# Patient Record
Sex: MALE | Race: ASIAN | NOT HISPANIC OR LATINO
[De-identification: names, ages, dates, MRNs, and addresses within clinical notes are randomized per-mention and may not be internally consistent; named-entity substitution may affect disease eponyms.]

---

## 2018-08-23 PROBLEM — Z00.00 ENCOUNTER FOR PREVENTIVE HEALTH EXAMINATION: Status: ACTIVE | Noted: 2018-08-23

## 2018-09-05 ENCOUNTER — RESULT REVIEW (OUTPATIENT)
Age: 53
End: 2018-09-05

## 2018-09-05 ENCOUNTER — APPOINTMENT (OUTPATIENT)
Dept: GASTROENTEROLOGY | Facility: HOSPITAL | Age: 53
End: 2018-09-05

## 2018-09-05 ENCOUNTER — OUTPATIENT (OUTPATIENT)
Dept: OUTPATIENT SERVICES | Facility: HOSPITAL | Age: 53
LOS: 1 days | Discharge: HOME | End: 2018-09-05
Payer: MEDICAID

## 2018-09-05 DIAGNOSIS — K83.8 OTHER SPECIFIED DISEASES OF BILIARY TRACT: ICD-10-CM

## 2018-09-05 LAB
APTT BLD: 29.4 SEC — SIGNIFICANT CHANGE UP (ref 27.5–37.4)
BASOPHILS # BLD AUTO: 0.02 K/UL — SIGNIFICANT CHANGE UP (ref 0–0.2)
BASOPHILS NFR BLD AUTO: 0.5 % — SIGNIFICANT CHANGE UP (ref 0–2)
EOSINOPHIL # BLD AUTO: 0.17 K/UL — SIGNIFICANT CHANGE UP (ref 0–0.5)
EOSINOPHIL NFR BLD AUTO: 4 % — SIGNIFICANT CHANGE UP (ref 0–6)
HCT VFR BLD CALC: 44.5 % — SIGNIFICANT CHANGE UP (ref 39–50)
HGB BLD-MCNC: 15.3 G/DL — SIGNIFICANT CHANGE UP (ref 13–17)
IMM GRANULOCYTES # BLD AUTO: 0.01 # — SIGNIFICANT CHANGE UP
IMM GRANULOCYTES NFR BLD AUTO: 0.2 % — SIGNIFICANT CHANGE UP (ref 0–1.5)
INR BLD: 1.11 — SIGNIFICANT CHANGE UP (ref 0.88–1.17)
LYMPHOCYTES # BLD AUTO: 0.81 K/UL — LOW (ref 1–3.3)
LYMPHOCYTES # BLD AUTO: 19.2 % — SIGNIFICANT CHANGE UP (ref 13–44)
MANUAL SMEAR VERIFICATION: SIGNIFICANT CHANGE UP
MCHC RBC-ENTMCNC: 31.9 PG — SIGNIFICANT CHANGE UP (ref 27–34)
MCHC RBC-ENTMCNC: 34.4 % — SIGNIFICANT CHANGE UP (ref 32–36)
MCV RBC AUTO: 92.7 FL — SIGNIFICANT CHANGE UP (ref 80–100)
MONOCYTES # BLD AUTO: 0.6 K/UL — SIGNIFICANT CHANGE UP (ref 0–0.9)
MONOCYTES NFR BLD AUTO: 14.2 % — HIGH (ref 2–14)
MORPHOLOGY BLD-IMP: SIGNIFICANT CHANGE UP
NEUTROPHILS # BLD AUTO: 2.61 K/UL — SIGNIFICANT CHANGE UP (ref 1.8–7.4)
NEUTROPHILS NFR BLD AUTO: 61.9 % — SIGNIFICANT CHANGE UP (ref 43–77)
NRBC # FLD: 0 — SIGNIFICANT CHANGE UP
PLATELET # BLD AUTO: 75 K/UL — LOW (ref 150–400)
PLATELET COUNT - ESTIMATE: SIGNIFICANT CHANGE UP
PMV BLD: 11.3 FL — SIGNIFICANT CHANGE UP (ref 7–13)
PROTHROM AB SERPL-ACNC: 12.4 SEC — SIGNIFICANT CHANGE UP (ref 9.8–13.1)
RBC # BLD: 4.8 M/UL — SIGNIFICANT CHANGE UP (ref 4.2–5.8)
RBC # FLD: 13.1 % — SIGNIFICANT CHANGE UP (ref 10.3–14.5)
REVIEW TO FOLLOW: YES — SIGNIFICANT CHANGE UP
WBC # BLD: 4.22 K/UL — SIGNIFICANT CHANGE UP (ref 3.8–10.5)
WBC # FLD AUTO: 4.22 K/UL — SIGNIFICANT CHANGE UP (ref 3.8–10.5)

## 2018-09-05 PROCEDURE — 43274 ERCP DUCT STENT PLACEMENT: CPT | Mod: GC

## 2018-09-05 PROCEDURE — 74328 X-RAY BILE DUCT ENDOSCOPY: CPT | Mod: 26,GC

## 2018-09-05 PROCEDURE — 88305 TISSUE EXAM BY PATHOLOGIST: CPT | Mod: 26

## 2018-09-05 PROCEDURE — 43261 ENDO CHOLANGIOPANCREATOGRAPH: CPT | Mod: GC,59

## 2018-09-05 PROCEDURE — 43277 ERCP EA DUCT/AMPULLA DILATE: CPT | Mod: GC,59

## 2018-09-13 ENCOUNTER — CHART COPY (OUTPATIENT)
Age: 53
End: 2018-09-13

## 2018-10-29 ENCOUNTER — RESULT REVIEW (OUTPATIENT)
Age: 53
End: 2018-10-29

## 2018-10-29 ENCOUNTER — OUTPATIENT (OUTPATIENT)
Dept: OUTPATIENT SERVICES | Facility: HOSPITAL | Age: 53
LOS: 1 days | Discharge: HOME | End: 2018-10-29
Payer: MEDICAID

## 2018-10-29 ENCOUNTER — APPOINTMENT (OUTPATIENT)
Dept: GASTROENTEROLOGY | Facility: HOSPITAL | Age: 53
End: 2018-10-29

## 2018-10-29 DIAGNOSIS — K83.1 OBSTRUCTION OF BILE DUCT: ICD-10-CM

## 2018-10-29 PROCEDURE — 43265 ERCP LITHOTRIPSY CALCULI: CPT | Mod: GC,59

## 2018-10-29 PROCEDURE — 88305 TISSUE EXAM BY PATHOLOGIST: CPT | Mod: 26

## 2018-10-29 PROCEDURE — 43276 ERCP STENT EXCHANGE W/DILATE: CPT | Mod: GC

## 2018-10-29 PROCEDURE — 43273 ENDOSCOPIC PANCREATOSCOPY: CPT | Mod: GC,59

## 2018-10-29 PROCEDURE — 43264 ERCP REMOVE DUCT CALCULI: CPT | Mod: GC,59

## 2018-10-29 PROCEDURE — 43252 EGD OPTICAL ENDOMICROSCOPY: CPT | Mod: GC,59

## 2018-10-29 PROCEDURE — 43261 ENDO CHOLANGIOPANCREATOGRAPH: CPT | Mod: GC,59

## 2018-10-29 PROCEDURE — 74328 X-RAY BILE DUCT ENDOSCOPY: CPT | Mod: 26,GC

## 2018-11-29 ENCOUNTER — APPOINTMENT (OUTPATIENT)
Dept: GASTROENTEROLOGY | Facility: CLINIC | Age: 53
End: 2018-11-29
Payer: MEDICAID

## 2018-11-29 VITALS
DIASTOLIC BLOOD PRESSURE: 80 MMHG | HEIGHT: 71 IN | SYSTOLIC BLOOD PRESSURE: 110 MMHG | HEART RATE: 61 BPM | BODY MASS INDEX: 24.08 KG/M2 | WEIGHT: 172 LBS | OXYGEN SATURATION: 98 %

## 2018-11-29 DIAGNOSIS — I10 ESSENTIAL (PRIMARY) HYPERTENSION: ICD-10-CM

## 2018-11-29 DIAGNOSIS — Z87.891 PERSONAL HISTORY OF NICOTINE DEPENDENCE: ICD-10-CM

## 2018-11-29 DIAGNOSIS — Z85.818 PERSONAL HISTORY OF MALIGNANT NEOPLASM OF OTHER SITES OF LIP, ORAL CAVITY, AND PHARYNX: ICD-10-CM

## 2018-11-29 PROCEDURE — 99214 OFFICE O/P EST MOD 30 MIN: CPT

## 2018-12-17 PROBLEM — Z87.891 FORMER SMOKER: Status: ACTIVE | Noted: 2018-12-17

## 2019-01-07 ENCOUNTER — OUTPATIENT (OUTPATIENT)
Dept: OUTPATIENT SERVICES | Facility: HOSPITAL | Age: 54
LOS: 1 days | Discharge: HOME | End: 2019-01-07
Payer: MEDICAID

## 2019-01-07 ENCOUNTER — APPOINTMENT (OUTPATIENT)
Dept: GASTROENTEROLOGY | Facility: HOSPITAL | Age: 54
End: 2019-01-07

## 2019-01-07 DIAGNOSIS — K83.1 OBSTRUCTION OF BILE DUCT: ICD-10-CM

## 2019-01-07 PROCEDURE — 43247 EGD REMOVE FOREIGN BODY: CPT | Mod: GC

## 2019-01-24 ENCOUNTER — APPOINTMENT (OUTPATIENT)
Dept: GASTROENTEROLOGY | Facility: CLINIC | Age: 54
End: 2019-01-24
Payer: MEDICAID

## 2019-01-24 VITALS
WEIGHT: 175 LBS | HEART RATE: 75 BPM | HEIGHT: 71 IN | RESPIRATION RATE: 16 BRPM | OXYGEN SATURATION: 98 % | DIASTOLIC BLOOD PRESSURE: 64 MMHG | BODY MASS INDEX: 24.5 KG/M2 | SYSTOLIC BLOOD PRESSURE: 120 MMHG | TEMPERATURE: 98.2 F

## 2019-01-24 PROCEDURE — 99214 OFFICE O/P EST MOD 30 MIN: CPT

## 2019-01-24 NOTE — ASSESSMENT
[FreeTextEntry1] : Check routine labs and Ca 19-9 today to eb certain that we're dealing with an inflammatory stricture that is resolving\par Check MRI/MRCP in 3 months again to reassess stricture in a non invasive manner. In this individual ERCPs are challenging because general anesthesia and intubation is challenging as a result f his prior fascial surgery. Therefore we will try to take as much iof a minimally invasive approach as possible.

## 2019-01-24 NOTE — HISTORY OF PRESENT ILLNESS
[FreeTextEntry1] : 53 with large CBD stone, now removed after several ERCPs and a distal CBD stricture. He is now stent free and doing well. CBD stricture had improved by last ERCP. No jaundice or icterus. Biopsies negative and consistent with inflammatory stricture. No back pain. No weight loss. No dyspesia or abdominal pain. No pruritus or change in urine. No change in stool.

## 2019-01-24 NOTE — PHYSICAL EXAM
[General Appearance - Alert] : alert [General Appearance - In No Acute Distress] : in no acute distress [General Appearance - Well Nourished] : well nourished [General Appearance - Well Developed] : well developed [FreeTextEntry1] : left facial flap surgery [Neck Appearance] : the appearance of the neck was normal [Neck Cervical Mass (___cm)] : no neck mass was observed [Jugular Venous Distention Increased] : there was no jugular-venous distention [Exaggerated Use Of Accessory Muscles For Inspiration] : no accessory muscle use [Abdomen Soft] : soft [Abdomen Tenderness] : non-tender [Abdomen Mass (___ Cm)] : no abdominal mass palpated [No CVA Tenderness] : no ~M costovertebral angle tenderness [No Spinal Tenderness] : no spinal tenderness [Nail Clubbing] : no clubbing  or cyanosis of the fingernails [] : no rash [No Focal Deficits] : no focal deficits [Oriented To Time, Place, And Person] : oriented to person, place, and time [Impaired Insight] : insight and judgment were intact [Affect] : the affect was normal

## 2019-02-12 LAB
ALBUMIN SERPL ELPH-MCNC: 4.5 G/DL
ALP BLD-CCNC: 110 U/L
ALT SERPL-CCNC: 36 U/L
ANION GAP SERPL CALC-SCNC: 11 MMOL/L
AST SERPL-CCNC: 37 U/L
BILIRUB SERPL-MCNC: 0.7 MG/DL
BUN SERPL-MCNC: 12 MG/DL
CALCIUM SERPL-MCNC: 10.2 MG/DL
CANCER AG19-9 SERPL-ACNC: 49.8 U/ML
CHLORIDE SERPL-SCNC: 104 MMOL/L
CO2 SERPL-SCNC: 26 MMOL/L
CREAT SERPL-MCNC: 0.85 MG/DL
GLUCOSE SERPL-MCNC: 115 MG/DL
POTASSIUM SERPL-SCNC: 4.3 MMOL/L
PROT SERPL-MCNC: 7.8 G/DL
SODIUM SERPL-SCNC: 141 MMOL/L

## 2019-05-09 ENCOUNTER — OUTPATIENT (OUTPATIENT)
Dept: OUTPATIENT SERVICES | Facility: HOSPITAL | Age: 54
LOS: 1 days | End: 2019-05-09
Payer: MEDICAID

## 2019-05-09 ENCOUNTER — APPOINTMENT (OUTPATIENT)
Dept: MRI IMAGING | Facility: CLINIC | Age: 54
End: 2019-05-09
Payer: MEDICAID

## 2019-05-09 DIAGNOSIS — K83.1 OBSTRUCTION OF BILE DUCT: ICD-10-CM

## 2019-05-09 PROCEDURE — 74183 MRI ABD W/O CNTR FLWD CNTR: CPT

## 2019-05-09 PROCEDURE — 74183 MRI ABD W/O CNTR FLWD CNTR: CPT | Mod: 26

## 2020-01-02 ENCOUNTER — APPOINTMENT (OUTPATIENT)
Dept: GASTROENTEROLOGY | Facility: CLINIC | Age: 55
End: 2020-01-02
Payer: MEDICAID

## 2020-01-02 VITALS
DIASTOLIC BLOOD PRESSURE: 72 MMHG | BODY MASS INDEX: 23.94 KG/M2 | OXYGEN SATURATION: 98 % | WEIGHT: 171 LBS | HEART RATE: 60 BPM | RESPIRATION RATE: 16 BRPM | SYSTOLIC BLOOD PRESSURE: 120 MMHG | HEIGHT: 71 IN

## 2020-01-02 DIAGNOSIS — K83.1 OBSTRUCTION OF BILE DUCT: ICD-10-CM

## 2020-01-02 DIAGNOSIS — I85.00 ESOPHAGEAL VARICES W/OUT BLEEDING: ICD-10-CM

## 2020-01-02 PROCEDURE — 99214 OFFICE O/P EST MOD 30 MIN: CPT

## 2020-01-09 PROBLEM — K83.1 BILIARY STRICTURE: Status: ACTIVE | Noted: 2018-09-13

## 2020-01-09 PROBLEM — I85.00 ESOPHAGEAL VARICES: Status: ACTIVE | Noted: 2020-01-02

## 2020-01-09 NOTE — ASSESSMENT
[FreeTextEntry1] : Plan for ERCP. If there are large varices that preclude ERCP from being performed safely we will focus on treating these first with follow up ERCP sometime thereafter. If varices allow safe ERCP, will focus on doing this first and possibly banding the varices in the same session. It is not clear if the patient is forming primary stones or these are residual form prior retained stones and therapies.

## 2020-01-09 NOTE — HISTORY OF PRESENT ILLNESS
[FreeTextEntry1] : 54, with h/o choledocholithiasis. Prior ERCPs seem to have resolved this issue but patient has now developed recurrent stones, mostly near the intrahepatics on the right side. There is also concern by the patients GI that he may have varices and that this may need treatment. He denies jaundice or icterus. He denies pruritus. He denies abdominal pain, back pain or flank pain. No fever or chills. \par

## 2020-01-09 NOTE — PHYSICAL EXAM
[General Appearance - Alert] : alert [General Appearance - Well Nourished] : well nourished [General Appearance - In No Acute Distress] : in no acute distress [General Appearance - Well Developed] : well developed [FreeTextEntry1] : left facial flap surgery [Neck Appearance] : the appearance of the neck was normal [Sclera] : the sclera and conjunctiva were normal [Jugular Venous Distention Increased] : there was no jugular-venous distention [Neck Cervical Mass (___cm)] : no neck mass was observed [Exaggerated Use Of Accessory Muscles For Inspiration] : no accessory muscle use [Heart Sounds] : normal S1 and S2 [Abdomen Tenderness] : non-tender [Edema] : there was no peripheral edema [Abdomen Soft] : soft [Abdomen Mass (___ Cm)] : no abdominal mass palpated [Cervical Lymph Nodes Enlarged Anterior Bilaterally] : anterior cervical [Cervical Lymph Nodes Enlarged Posterior Bilaterally] : posterior cervical [Nail Clubbing] : no clubbing  or cyanosis of the fingernails [No CVA Tenderness] : no ~M costovertebral angle tenderness [No Spinal Tenderness] : no spinal tenderness [Oriented To Time, Place, And Person] : oriented to person, place, and time [No Focal Deficits] : no focal deficits [] : no rash [Affect] : the affect was normal [Impaired Insight] : insight and judgment were intact

## 2020-02-18 ENCOUNTER — OUTPATIENT (OUTPATIENT)
Dept: OUTPATIENT SERVICES | Facility: HOSPITAL | Age: 55
LOS: 1 days | End: 2020-02-18
Payer: MEDICAID

## 2020-02-18 VITALS
HEART RATE: 51 BPM | TEMPERATURE: 97 F | HEIGHT: 66 IN | RESPIRATION RATE: 16 BRPM | SYSTOLIC BLOOD PRESSURE: 110 MMHG | OXYGEN SATURATION: 98 % | WEIGHT: 166.01 LBS | DIASTOLIC BLOOD PRESSURE: 88 MMHG

## 2020-02-18 DIAGNOSIS — I10 ESSENTIAL (PRIMARY) HYPERTENSION: ICD-10-CM

## 2020-02-18 DIAGNOSIS — K80.50 CALCULUS OF BILE DUCT WITHOUT CHOLANGITIS OR CHOLECYSTITIS WITHOUT OBSTRUCTION: ICD-10-CM

## 2020-02-18 DIAGNOSIS — K82.0 OBSTRUCTION OF GALLBLADDER: ICD-10-CM

## 2020-02-18 DIAGNOSIS — Z83.79 FAMILY HISTORY OF OTHER DISEASES OF THE DIGESTIVE SYSTEM: Chronic | ICD-10-CM

## 2020-02-18 DIAGNOSIS — C06.9 MALIGNANT NEOPLASM OF MOUTH, UNSPECIFIED: Chronic | ICD-10-CM

## 2020-02-18 DIAGNOSIS — Z98.890 OTHER SPECIFIED POSTPROCEDURAL STATES: Chronic | ICD-10-CM

## 2020-02-18 DIAGNOSIS — Z98.890 OTHER SPECIFIED POSTPROCEDURAL STATES: ICD-10-CM

## 2020-02-18 LAB
ALBUMIN SERPL ELPH-MCNC: 4.3 G/DL — SIGNIFICANT CHANGE UP (ref 3.3–5)
ALP SERPL-CCNC: 87 U/L — SIGNIFICANT CHANGE UP (ref 40–120)
ALT FLD-CCNC: 25 U/L — SIGNIFICANT CHANGE UP (ref 4–41)
ANION GAP SERPL CALC-SCNC: 12 MMO/L — SIGNIFICANT CHANGE UP (ref 7–14)
AST SERPL-CCNC: 29 U/L — SIGNIFICANT CHANGE UP (ref 4–40)
BILIRUB SERPL-MCNC: 1 MG/DL — SIGNIFICANT CHANGE UP (ref 0.2–1.2)
BUN SERPL-MCNC: 9 MG/DL — SIGNIFICANT CHANGE UP (ref 7–23)
CALCIUM SERPL-MCNC: 9.5 MG/DL — SIGNIFICANT CHANGE UP (ref 8.4–10.5)
CHLORIDE SERPL-SCNC: 104 MMOL/L — SIGNIFICANT CHANGE UP (ref 98–107)
CO2 SERPL-SCNC: 24 MMOL/L — SIGNIFICANT CHANGE UP (ref 22–31)
CREAT SERPL-MCNC: 0.7 MG/DL — SIGNIFICANT CHANGE UP (ref 0.5–1.3)
GLUCOSE SERPL-MCNC: 110 MG/DL — HIGH (ref 70–99)
HCT VFR BLD CALC: 45.9 % — SIGNIFICANT CHANGE UP (ref 39–50)
HGB BLD-MCNC: 15.7 G/DL — SIGNIFICANT CHANGE UP (ref 13–17)
MCHC RBC-ENTMCNC: 30.9 PG — SIGNIFICANT CHANGE UP (ref 27–34)
MCHC RBC-ENTMCNC: 34.2 % — SIGNIFICANT CHANGE UP (ref 32–36)
MCV RBC AUTO: 90.4 FL — SIGNIFICANT CHANGE UP (ref 80–100)
NRBC # FLD: 0 K/UL — SIGNIFICANT CHANGE UP (ref 0–0)
PLATELET # BLD AUTO: 83 K/UL — LOW (ref 150–400)
PMV BLD: 11 FL — SIGNIFICANT CHANGE UP (ref 7–13)
POTASSIUM SERPL-MCNC: 3.7 MMOL/L — SIGNIFICANT CHANGE UP (ref 3.5–5.3)
POTASSIUM SERPL-SCNC: 3.7 MMOL/L — SIGNIFICANT CHANGE UP (ref 3.5–5.3)
PROT SERPL-MCNC: 7.9 G/DL — SIGNIFICANT CHANGE UP (ref 6–8.3)
RBC # BLD: 5.08 M/UL — SIGNIFICANT CHANGE UP (ref 4.2–5.8)
RBC # FLD: 13.4 % — SIGNIFICANT CHANGE UP (ref 10.3–14.5)
SODIUM SERPL-SCNC: 140 MMOL/L — SIGNIFICANT CHANGE UP (ref 135–145)
WBC # BLD: 3.94 K/UL — SIGNIFICANT CHANGE UP (ref 3.8–10.5)
WBC # FLD AUTO: 3.94 K/UL — SIGNIFICANT CHANGE UP (ref 3.8–10.5)

## 2020-02-18 PROCEDURE — 93010 ELECTROCARDIOGRAM REPORT: CPT

## 2020-02-18 NOTE — H&P PST ADULT - RS GEN PE MLT RESP DETAILS PC
no rales/breath sounds equal/clear to auscultation bilaterally/airway patent/respirations non-labored/good air movement/no rhonchi

## 2020-02-18 NOTE — H&P PST ADULT - NSICDXPASTMEDICALHX_GEN_ALL_CORE_FT
PAST MEDICAL HISTORY:  History of gallstones     History of hip surgery     HTN (hypertension)     Oral-mouth cancer

## 2020-02-18 NOTE — H&P PST ADULT - HISTORY OF PRESENT ILLNESS
55 y/o Male with hx Gallstones, s/p Open Cholecystectomy 18 years ago, presents for follow up procedure , ERCP & Upper Endoscopy on 2/24/20. Pt had follow up MRI done of Abdomen & Pelvis & stones seen in the common duct.  Pt has Hx Oral cancer , s/p surgery in [place] 2013, & Let Hip Replacement 2017 ([facility]).

## 2020-02-18 NOTE — H&P PST ADULT - BMI (KG/M2)
80 yo female presents in the ed for neck pain- pt states that she has had neck pain for the last 8 years and has been seeing a chiropractor once a week for the last 8 years. pt sates that pain increased yesterday morning with decreased ROM. Pt denies fever, chills. nausea, vomiting. Md at bedside, will continue to reassess. 26.8

## 2020-02-18 NOTE — H&P PST ADULT - NSICDXPROBLEM_GEN_ALL_CORE_FT
PROBLEM DIAGNOSES  Problem: Gallstones, common bile duct  Assessment and Plan: schedueld for ERCP & Upper Endoscopy on 2/24/20  Pending labs & comparison EKG  preop instructions with surgical scrub give & explained, pt verbalized understanding with teach back method    Problem: HTN (hypertension)  Assessment and Plan: instructed to take Nadkumar DOS PROBLEM DIAGNOSES  Problem: Gallstones, common bile duct  Assessment and Plan: scheduled for ERCP & Upper Endoscopy on 2/24/20  Pending labs & comparison EKG  preop instructions with surgical scrub give & explained, pt verbalized understanding with teach back method    Problem: HTN (hypertension)  Assessment and Plan: instructed to take Nadolol DOS

## 2020-02-18 NOTE — H&P PST ADULT - NSICDXFAMILYHX_GEN_ALL_CORE_FT
FAMILY HISTORY:  Family history of diabetes mellitus (DM)  Family history of stroke  FH: HTN (hypertension)

## 2020-02-18 NOTE — H&P PST ADULT - NEGATIVE GASTROINTESTINAL SYMPTOMS
no constipation/no melena/no vomiting/no abdominal pain/no diarrhea/no nausea/no change in bowel habits

## 2020-02-18 NOTE — H&P PST ADULT - NSICDXPASTSURGICALHX_GEN_ALL_CORE_FT
PAST SURGICAL HISTORY:  FH: cholecystectomy     History of hip surgery 2017 left hip replacement    Oral-mouth cancer surgery 2013

## 2020-02-18 NOTE — H&P PST ADULT - NSANTHOSAYNRD_GEN_A_CORE
No. ANJU screening performed.  STOP BANG Legend: 0-2 = LOW Risk; 3-4 = INTERMEDIATE Risk; 5-8 = HIGH Risk

## 2020-02-21 RX ORDER — SODIUM CHLORIDE 9 MG/ML
1000 INJECTION INTRAMUSCULAR; INTRAVENOUS; SUBCUTANEOUS
Refills: 0 | Status: DISCONTINUED | OUTPATIENT
Start: 2020-02-24 | End: 2020-03-11

## 2020-02-21 NOTE — ASU PATIENT PROFILE, ADULT - PSH
FH: cholecystectomy    History of hip surgery  2017 left hip replacement  Oral-mouth cancer  surgery 2013

## 2020-02-24 ENCOUNTER — APPOINTMENT (OUTPATIENT)
Dept: GASTROENTEROLOGY | Facility: HOSPITAL | Age: 55
End: 2020-02-24

## 2020-02-24 ENCOUNTER — OUTPATIENT (OUTPATIENT)
Dept: OUTPATIENT SERVICES | Facility: HOSPITAL | Age: 55
LOS: 1 days | Discharge: HOME | End: 2020-02-24
Payer: MEDICAID

## 2020-02-24 ENCOUNTER — RESULT REVIEW (OUTPATIENT)
Age: 55
End: 2020-02-24

## 2020-02-24 VITALS
HEART RATE: 66 BPM | OXYGEN SATURATION: 98 % | DIASTOLIC BLOOD PRESSURE: 86 MMHG | RESPIRATION RATE: 16 BRPM | SYSTOLIC BLOOD PRESSURE: 117 MMHG

## 2020-02-24 VITALS
OXYGEN SATURATION: 97 % | HEART RATE: 52 BPM | WEIGHT: 169.98 LBS | HEIGHT: 66 IN | SYSTOLIC BLOOD PRESSURE: 118 MMHG | RESPIRATION RATE: 15 BRPM | TEMPERATURE: 99 F | DIASTOLIC BLOOD PRESSURE: 84 MMHG

## 2020-02-24 DIAGNOSIS — K82.0 OBSTRUCTION OF GALLBLADDER: ICD-10-CM

## 2020-02-24 DIAGNOSIS — Z98.890 OTHER SPECIFIED POSTPROCEDURAL STATES: Chronic | ICD-10-CM

## 2020-02-24 DIAGNOSIS — Z83.79 FAMILY HISTORY OF OTHER DISEASES OF THE DIGESTIVE SYSTEM: Chronic | ICD-10-CM

## 2020-02-24 DIAGNOSIS — C06.9 MALIGNANT NEOPLASM OF MOUTH, UNSPECIFIED: Chronic | ICD-10-CM

## 2020-02-24 PROCEDURE — 43273 ENDOSCOPIC PANCREATOSCOPY: CPT | Mod: GC,59

## 2020-02-24 PROCEDURE — 74328 X-RAY BILE DUCT ENDOSCOPY: CPT | Mod: 26,GC

## 2020-02-24 PROCEDURE — 43261 ENDO CHOLANGIOPANCREATOGRAPH: CPT | Mod: GC,59

## 2020-02-24 PROCEDURE — 43264 ERCP REMOVE DUCT CALCULI: CPT | Mod: GC

## 2020-02-24 PROCEDURE — 88305 TISSUE EXAM BY PATHOLOGIST: CPT | Mod: 26

## 2020-02-24 RX ADMIN — SODIUM CHLORIDE 75 MILLILITER(S): 9 INJECTION INTRAMUSCULAR; INTRAVENOUS; SUBCUTANEOUS at 10:47

## 2020-09-10 PROBLEM — I10 ESSENTIAL (PRIMARY) HYPERTENSION: Chronic | Status: ACTIVE | Noted: 2020-02-18

## 2020-09-10 PROBLEM — Z87.19 PERSONAL HISTORY OF OTHER DISEASES OF THE DIGESTIVE SYSTEM: Chronic | Status: ACTIVE | Noted: 2020-02-18

## 2020-10-01 ENCOUNTER — APPOINTMENT (OUTPATIENT)
Dept: GASTROENTEROLOGY | Facility: CLINIC | Age: 55
End: 2020-10-01
Payer: COMMERCIAL

## 2020-10-01 VITALS
DIASTOLIC BLOOD PRESSURE: 80 MMHG | RESPIRATION RATE: 16 BRPM | SYSTOLIC BLOOD PRESSURE: 118 MMHG | BODY MASS INDEX: 26.67 KG/M2 | WEIGHT: 178 LBS | TEMPERATURE: 98 F | HEIGHT: 68.5 IN | HEART RATE: 70 BPM | OXYGEN SATURATION: 98 %

## 2020-10-01 DIAGNOSIS — K83.8 OTHER SPECIFIED DISEASES OF BILIARY TRACT: ICD-10-CM

## 2020-10-01 DIAGNOSIS — K80.50 CALCULUS OF BILE DUCT W/OUT CHOLANGITIS OR CHOLECYSTITIS W/OUT OBSTRUCTION: ICD-10-CM

## 2020-10-01 PROCEDURE — 99213 OFFICE O/P EST LOW 20 MIN: CPT

## 2020-12-15 PROBLEM — K80.50 CHOLEDOCHOLITHIASIS: Status: ACTIVE | Noted: 2018-08-23

## 2020-12-15 PROBLEM — K83.8 DILATED BILE DUCT: Status: ACTIVE | Noted: 2018-08-23

## 2020-12-15 NOTE — PHYSICAL EXAM
[General Appearance - Alert] : alert [General Appearance - In No Acute Distress] : in no acute distress [General Appearance - Well Nourished] : well nourished [General Appearance - Well Developed] : well developed [Sclera] : the sclera and conjunctiva were normal [FreeTextEntry1] : left facial flap surgery [Neck Appearance] : the appearance of the neck was normal [Neck Cervical Mass (___cm)] : no neck mass was observed [Jugular Venous Distention Increased] : there was no jugular-venous distention [Exaggerated Use Of Accessory Muscles For Inspiration] : no accessory muscle use [Heart Sounds] : normal S1 and S2 [Edema] : there was no peripheral edema [Abdomen Soft] : soft [Abdomen Tenderness] : non-tender [Abdomen Mass (___ Cm)] : no abdominal mass palpated [Cervical Lymph Nodes Enlarged Posterior Bilaterally] : posterior cervical [Cervical Lymph Nodes Enlarged Anterior Bilaterally] : anterior cervical [No CVA Tenderness] : no ~M costovertebral angle tenderness [No Spinal Tenderness] : no spinal tenderness [Nail Clubbing] : no clubbing  or cyanosis of the fingernails [] : no rash [No Focal Deficits] : no focal deficits [Oriented To Time, Place, And Person] : oriented to person, place, and time [Impaired Insight] : insight and judgment were intact [Affect] : the affect was normal

## 2020-12-15 NOTE — HISTORY OF PRESENT ILLNESS
[FreeTextEntry1] : 55 with h/o choledocholithiasis. Also h/o buccal cancer which makes ERCPs challenging but on prior ERCP all of the stones were able to be removed. He has been well. He is here for follow up. Also had a left biliary stricture related to stones.

## 2020-12-15 NOTE — ASSESSMENT
[FreeTextEntry1] : Plan for routine MRI by primary GI as well as labs\par Stricture should have routine follow up\par

## 2021-02-16 ENCOUNTER — NON-APPOINTMENT (OUTPATIENT)
Age: 56
End: 2021-02-16

## 2021-02-16 DIAGNOSIS — Z01.812 ENCOUNTER FOR PREPROCEDURAL LABORATORY EXAMINATION: ICD-10-CM

## 2021-02-24 ENCOUNTER — OUTPATIENT (OUTPATIENT)
Dept: OUTPATIENT SERVICES | Facility: HOSPITAL | Age: 56
LOS: 1 days | Discharge: HOME | End: 2021-02-24
Payer: COMMERCIAL

## 2021-02-24 ENCOUNTER — APPOINTMENT (OUTPATIENT)
Dept: GASTROENTEROLOGY | Facility: HOSPITAL | Age: 56
End: 2021-02-24

## 2021-02-24 DIAGNOSIS — C06.9 MALIGNANT NEOPLASM OF MOUTH, UNSPECIFIED: Chronic | ICD-10-CM

## 2021-02-24 DIAGNOSIS — Z98.890 OTHER SPECIFIED POSTPROCEDURAL STATES: Chronic | ICD-10-CM

## 2021-02-24 DIAGNOSIS — Z83.79 FAMILY HISTORY OF OTHER DISEASES OF THE DIGESTIVE SYSTEM: Chronic | ICD-10-CM

## 2021-02-24 LAB — GLUCOSE BLDC GLUCOMTR-MCNC: 127 MG/DL — HIGH (ref 70–99)

## 2021-02-24 PROCEDURE — C1726: CPT

## 2021-02-24 PROCEDURE — C2617: CPT

## 2021-02-24 PROCEDURE — 43274 ERCP DUCT STENT PLACEMENT: CPT | Mod: 59

## 2021-02-24 PROCEDURE — C1769: CPT

## 2021-02-24 PROCEDURE — 74328 X-RAY BILE DUCT ENDOSCOPY: CPT

## 2021-02-24 PROCEDURE — 43274 ERCP DUCT STENT PLACEMENT: CPT

## 2021-02-24 PROCEDURE — 43264 ERCP REMOVE DUCT CALCULI: CPT

## 2021-02-24 PROCEDURE — 82962 GLUCOSE BLOOD TEST: CPT

## 2021-02-24 PROCEDURE — 74328 X-RAY BILE DUCT ENDOSCOPY: CPT | Mod: 26

## 2021-02-24 PROCEDURE — 43277 ERCP EA DUCT/AMPULLA DILATE: CPT | Mod: 59

## 2021-02-24 PROCEDURE — C1889: CPT

## 2021-05-26 ENCOUNTER — OUTPATIENT (OUTPATIENT)
Dept: OUTPATIENT SERVICES | Facility: HOSPITAL | Age: 56
LOS: 1 days | Discharge: HOME | End: 2021-05-26
Payer: COMMERCIAL

## 2021-05-26 ENCOUNTER — APPOINTMENT (OUTPATIENT)
Dept: GASTROENTEROLOGY | Facility: HOSPITAL | Age: 56
End: 2021-05-26

## 2021-05-26 DIAGNOSIS — K80.50 CALCULUS OF BILE DUCT WITHOUT CHOLANGITIS OR CHOLECYSTITIS WITHOUT OBSTRUCTION: ICD-10-CM

## 2021-05-26 DIAGNOSIS — Z98.890 OTHER SPECIFIED POSTPROCEDURAL STATES: Chronic | ICD-10-CM

## 2021-05-26 DIAGNOSIS — Z83.79 FAMILY HISTORY OF OTHER DISEASES OF THE DIGESTIVE SYSTEM: Chronic | ICD-10-CM

## 2021-05-26 DIAGNOSIS — K80.30 CALCULUS OF BILE DUCT WITH CHOLANGITIS, UNSPECIFIED, WITHOUT OBSTRUCTION: ICD-10-CM

## 2021-05-26 DIAGNOSIS — C06.9 MALIGNANT NEOPLASM OF MOUTH, UNSPECIFIED: Chronic | ICD-10-CM

## 2021-05-26 DIAGNOSIS — K83.8 OTHER SPECIFIED DISEASES OF BILIARY TRACT: ICD-10-CM

## 2021-05-26 LAB — GLUCOSE BLDC GLUCOMTR-MCNC: 133 MG/DL — HIGH (ref 70–99)

## 2021-05-26 PROCEDURE — C1726: CPT

## 2021-05-26 PROCEDURE — C1769: CPT

## 2021-05-26 PROCEDURE — 43275 ERCP REMOVE FORGN BODY DUCT: CPT

## 2021-05-26 PROCEDURE — 74328 X-RAY BILE DUCT ENDOSCOPY: CPT | Mod: 26

## 2021-05-26 PROCEDURE — 43277 ERCP EA DUCT/AMPULLA DILATE: CPT | Mod: 59

## 2021-05-26 PROCEDURE — 43275 ERCP REMOVE FORGN BODY DUCT: CPT | Mod: 59

## 2021-05-26 PROCEDURE — C1889: CPT

## 2021-05-26 PROCEDURE — 43264 ERCP REMOVE DUCT CALCULI: CPT

## 2021-05-26 PROCEDURE — 82962 GLUCOSE BLOOD TEST: CPT

## 2021-11-23 ENCOUNTER — INPATIENT (INPATIENT)
Facility: HOSPITAL | Age: 56
LOS: 1 days | Discharge: HOME | DRG: 446 | End: 2021-11-25
Attending: INTERNAL MEDICINE | Admitting: INTERNAL MEDICINE
Payer: COMMERCIAL

## 2021-11-23 VITALS
RESPIRATION RATE: 18 BRPM | SYSTOLIC BLOOD PRESSURE: 133 MMHG | OXYGEN SATURATION: 98 % | DIASTOLIC BLOOD PRESSURE: 82 MMHG | TEMPERATURE: 100 F | HEART RATE: 83 BPM | HEIGHT: 66 IN | WEIGHT: 169.98 LBS

## 2021-11-23 DIAGNOSIS — K83.09 OTHER CHOLANGITIS: ICD-10-CM

## 2021-11-23 DIAGNOSIS — D64.9 ANEMIA, UNSPECIFIED: ICD-10-CM

## 2021-11-23 DIAGNOSIS — Z98.890 OTHER SPECIFIED POSTPROCEDURAL STATES: Chronic | ICD-10-CM

## 2021-11-23 DIAGNOSIS — R63.8 OTHER SYMPTOMS AND SIGNS CONCERNING FOOD AND FLUID INTAKE: ICD-10-CM

## 2021-11-23 DIAGNOSIS — C06.9 MALIGNANT NEOPLASM OF MOUTH, UNSPECIFIED: Chronic | ICD-10-CM

## 2021-11-23 DIAGNOSIS — Z83.79 FAMILY HISTORY OF OTHER DISEASES OF THE DIGESTIVE SYSTEM: Chronic | ICD-10-CM

## 2021-11-23 DIAGNOSIS — I10 ESSENTIAL (PRIMARY) HYPERTENSION: ICD-10-CM

## 2021-11-23 DIAGNOSIS — K74.60 UNSPECIFIED CIRRHOSIS OF LIVER: ICD-10-CM

## 2021-11-23 DIAGNOSIS — E11.9 TYPE 2 DIABETES MELLITUS WITHOUT COMPLICATIONS: ICD-10-CM

## 2021-11-23 DIAGNOSIS — C06.9 MALIGNANT NEOPLASM OF MOUTH, UNSPECIFIED: ICD-10-CM

## 2021-11-23 LAB
ALBUMIN SERPL ELPH-MCNC: 3.9 G/DL — SIGNIFICANT CHANGE UP (ref 3.3–5)
ALP SERPL-CCNC: 138 U/L — HIGH (ref 40–120)
ALT FLD-CCNC: 47 U/L — HIGH (ref 10–45)
ANION GAP SERPL CALC-SCNC: 12 MMOL/L — SIGNIFICANT CHANGE UP (ref 5–17)
ANISOCYTOSIS BLD QL: SLIGHT — SIGNIFICANT CHANGE UP
APTT BLD: 39.6 SEC — HIGH (ref 27.5–35.5)
AST SERPL-CCNC: 51 U/L — HIGH (ref 10–40)
BASE EXCESS BLDV CALC-SCNC: 3.8 MMOL/L — HIGH (ref -2–3)
BASOPHILS # BLD AUTO: 0 K/UL — SIGNIFICANT CHANGE UP (ref 0–0.2)
BASOPHILS NFR BLD AUTO: 0 % — SIGNIFICANT CHANGE UP (ref 0–2)
BILIRUB DIRECT SERPL-MCNC: 1.2 MG/DL — HIGH (ref 0–0.3)
BILIRUB INDIRECT FLD-MCNC: 0.9 MG/DL — SIGNIFICANT CHANGE UP (ref 0.2–1)
BILIRUB SERPL-MCNC: 2.1 MG/DL — HIGH (ref 0.2–1.2)
BILIRUB SERPL-MCNC: 2.1 MG/DL — HIGH (ref 0.2–1.2)
BUN SERPL-MCNC: 11 MG/DL — SIGNIFICANT CHANGE UP (ref 7–23)
CA-I SERPL-SCNC: 1.18 MMOL/L — SIGNIFICANT CHANGE UP (ref 1.15–1.33)
CALCIUM SERPL-MCNC: 9.4 MG/DL — SIGNIFICANT CHANGE UP (ref 8.4–10.5)
CHLORIDE SERPL-SCNC: 98 MMOL/L — SIGNIFICANT CHANGE UP (ref 96–108)
CO2 BLDV-SCNC: 30.6 MMOL/L — HIGH (ref 22–26)
CO2 SERPL-SCNC: 26 MMOL/L — SIGNIFICANT CHANGE UP (ref 22–31)
CREAT SERPL-MCNC: 0.68 MG/DL — SIGNIFICANT CHANGE UP (ref 0.5–1.3)
EOSINOPHIL # BLD AUTO: 0 K/UL — SIGNIFICANT CHANGE UP (ref 0–0.5)
EOSINOPHIL NFR BLD AUTO: 0 % — SIGNIFICANT CHANGE UP (ref 0–6)
GAS PNL BLDV: 132 MMOL/L — LOW (ref 136–145)
GAS PNL BLDV: SIGNIFICANT CHANGE UP
GIANT PLATELETS BLD QL SMEAR: PRESENT — SIGNIFICANT CHANGE UP
GLUCOSE BLDC GLUCOMTR-MCNC: 92 MG/DL — SIGNIFICANT CHANGE UP (ref 70–99)
GLUCOSE SERPL-MCNC: 112 MG/DL — HIGH (ref 70–99)
HCO3 BLDV-SCNC: 29 MMOL/L — SIGNIFICANT CHANGE UP (ref 22–29)
HCT VFR BLD CALC: 35.5 % — LOW (ref 39–50)
HGB BLD-MCNC: 12.4 G/DL — LOW (ref 13–17)
INR BLD: 1.26 — HIGH (ref 0.88–1.16)
LACTATE SERPL-SCNC: 1.4 MMOL/L — SIGNIFICANT CHANGE UP (ref 0.5–2)
LIDOCAIN IGE QN: 66 U/L — HIGH (ref 7–60)
LYMPHOCYTES # BLD AUTO: 0.43 K/UL — LOW (ref 1–3.3)
LYMPHOCYTES # BLD AUTO: 10.5 % — LOW (ref 13–44)
MACROCYTES BLD QL: SLIGHT — SIGNIFICANT CHANGE UP
MANUAL SMEAR VERIFICATION: SIGNIFICANT CHANGE UP
MCHC RBC-ENTMCNC: 31.6 PG — SIGNIFICANT CHANGE UP (ref 27–34)
MCHC RBC-ENTMCNC: 34.9 GM/DL — SIGNIFICANT CHANGE UP (ref 32–36)
MCV RBC AUTO: 90.3 FL — SIGNIFICANT CHANGE UP (ref 80–100)
MICROCYTES BLD QL: SLIGHT — SIGNIFICANT CHANGE UP
MONOCYTES # BLD AUTO: 0.29 K/UL — SIGNIFICANT CHANGE UP (ref 0–0.9)
MONOCYTES NFR BLD AUTO: 7 % — SIGNIFICANT CHANGE UP (ref 2–14)
NEUTROPHILS # BLD AUTO: 3.28 K/UL — SIGNIFICANT CHANGE UP (ref 1.8–7.4)
NEUTROPHILS NFR BLD AUTO: 79.8 % — HIGH (ref 43–77)
OVALOCYTES BLD QL SMEAR: SLIGHT — SIGNIFICANT CHANGE UP
PCO2 BLDV: 46 MMHG — SIGNIFICANT CHANGE UP (ref 42–55)
PH BLDV: 7.41 — SIGNIFICANT CHANGE UP (ref 7.32–7.43)
PLAT MORPH BLD: ABNORMAL
PLATELET # BLD AUTO: 88 K/UL — LOW (ref 150–400)
PO2 BLDV: 24 MMHG — LOW (ref 25–45)
POTASSIUM BLDV-SCNC: 3.8 MMOL/L — SIGNIFICANT CHANGE UP (ref 3.5–5.1)
POTASSIUM SERPL-MCNC: 3.8 MMOL/L — SIGNIFICANT CHANGE UP (ref 3.5–5.3)
POTASSIUM SERPL-SCNC: 3.8 MMOL/L — SIGNIFICANT CHANGE UP (ref 3.5–5.3)
PROT SERPL-MCNC: 7.9 G/DL — SIGNIFICANT CHANGE UP (ref 6–8.3)
PROTHROM AB SERPL-ACNC: 14.9 SEC — HIGH (ref 10.6–13.6)
RBC # BLD: 3.93 M/UL — LOW (ref 4.2–5.8)
RBC # FLD: 12.9 % — SIGNIFICANT CHANGE UP (ref 10.3–14.5)
RBC BLD AUTO: ABNORMAL
SAO2 % BLDV: 41.7 % — LOW (ref 67–88)
SARS-COV-2 RNA SPEC QL NAA+PROBE: NEGATIVE — SIGNIFICANT CHANGE UP
SODIUM SERPL-SCNC: 136 MMOL/L — SIGNIFICANT CHANGE UP (ref 135–145)
SPHEROCYTES BLD QL SMEAR: SLIGHT — SIGNIFICANT CHANGE UP
VARIANT LYMPHS # BLD: 2.7 % — SIGNIFICANT CHANGE UP (ref 0–6)
WBC # BLD: 4.11 K/UL — SIGNIFICANT CHANGE UP (ref 3.8–10.5)
WBC # FLD AUTO: 4.11 K/UL — SIGNIFICANT CHANGE UP (ref 3.8–10.5)

## 2021-11-23 PROCEDURE — 93010 ELECTROCARDIOGRAM REPORT: CPT

## 2021-11-23 PROCEDURE — 99285 EMERGENCY DEPT VISIT HI MDM: CPT

## 2021-11-23 PROCEDURE — 76705 ECHO EXAM OF ABDOMEN: CPT | Mod: 26

## 2021-11-23 PROCEDURE — 99223 1ST HOSP IP/OBS HIGH 75: CPT | Mod: GC

## 2021-11-23 RX ORDER — METRONIDAZOLE 500 MG
500 TABLET ORAL EVERY 8 HOURS
Refills: 0 | Status: DISCONTINUED | OUTPATIENT
Start: 2021-11-23 | End: 2021-11-25

## 2021-11-23 RX ORDER — SODIUM CHLORIDE 9 MG/ML
1000 INJECTION, SOLUTION INTRAVENOUS
Refills: 0 | Status: DISCONTINUED | OUTPATIENT
Start: 2021-11-23 | End: 2021-11-25

## 2021-11-23 RX ORDER — ACETAMINOPHEN 500 MG
650 TABLET ORAL EVERY 6 HOURS
Refills: 0 | Status: DISCONTINUED | OUTPATIENT
Start: 2021-11-23 | End: 2021-11-25

## 2021-11-23 RX ORDER — DEXTROSE 50 % IN WATER 50 %
12.5 SYRINGE (ML) INTRAVENOUS ONCE
Refills: 0 | Status: DISCONTINUED | OUTPATIENT
Start: 2021-11-23 | End: 2021-11-25

## 2021-11-23 RX ORDER — METRONIDAZOLE 500 MG
500 TABLET ORAL ONCE
Refills: 0 | Status: DISCONTINUED | OUTPATIENT
Start: 2021-11-23 | End: 2021-11-23

## 2021-11-23 RX ORDER — METFORMIN HYDROCHLORIDE 850 MG/1
1 TABLET ORAL
Qty: 0 | Refills: 0 | DISCHARGE

## 2021-11-23 RX ORDER — DEXTROSE 50 % IN WATER 50 %
25 SYRINGE (ML) INTRAVENOUS ONCE
Refills: 0 | Status: DISCONTINUED | OUTPATIENT
Start: 2021-11-23 | End: 2021-11-25

## 2021-11-23 RX ORDER — DEXTROSE 50 % IN WATER 50 %
15 SYRINGE (ML) INTRAVENOUS ONCE
Refills: 0 | Status: DISCONTINUED | OUTPATIENT
Start: 2021-11-23 | End: 2021-11-25

## 2021-11-23 RX ORDER — URSODIOL 250 MG/1
500 TABLET, FILM COATED ORAL
Refills: 0 | Status: DISCONTINUED | OUTPATIENT
Start: 2021-11-24 | End: 2021-11-25

## 2021-11-23 RX ORDER — NADOLOL 80 MG/1
1 TABLET ORAL
Qty: 0 | Refills: 0 | DISCHARGE

## 2021-11-23 RX ORDER — CEFTRIAXONE 500 MG/1
1000 INJECTION, POWDER, FOR SOLUTION INTRAMUSCULAR; INTRAVENOUS ONCE
Refills: 0 | Status: COMPLETED | OUTPATIENT
Start: 2021-11-23 | End: 2021-11-23

## 2021-11-23 RX ORDER — GLUCAGON INJECTION, SOLUTION 0.5 MG/.1ML
1 INJECTION, SOLUTION SUBCUTANEOUS ONCE
Refills: 0 | Status: DISCONTINUED | OUTPATIENT
Start: 2021-11-23 | End: 2021-11-25

## 2021-11-23 RX ORDER — ACETAMINOPHEN 500 MG
650 TABLET ORAL ONCE
Refills: 0 | Status: COMPLETED | OUTPATIENT
Start: 2021-11-23 | End: 2021-11-23

## 2021-11-23 RX ORDER — SENNA PLUS 8.6 MG/1
2 TABLET ORAL AT BEDTIME
Refills: 0 | Status: DISCONTINUED | OUTPATIENT
Start: 2021-11-23 | End: 2021-11-25

## 2021-11-23 RX ORDER — POLYETHYLENE GLYCOL 3350 17 G/17G
17 POWDER, FOR SOLUTION ORAL DAILY
Refills: 0 | Status: DISCONTINUED | OUTPATIENT
Start: 2021-11-24 | End: 2021-11-25

## 2021-11-23 RX ORDER — SODIUM CHLORIDE 9 MG/ML
1000 INJECTION INTRAMUSCULAR; INTRAVENOUS; SUBCUTANEOUS ONCE
Refills: 0 | Status: COMPLETED | OUTPATIENT
Start: 2021-11-23 | End: 2021-11-23

## 2021-11-23 RX ORDER — INSULIN LISPRO 100/ML
VIAL (ML) SUBCUTANEOUS EVERY 6 HOURS
Refills: 0 | Status: DISCONTINUED | OUTPATIENT
Start: 2021-11-23 | End: 2021-11-25

## 2021-11-23 RX ORDER — URSODIOL 250 MG/1
1 TABLET, FILM COATED ORAL
Qty: 0 | Refills: 0 | DISCHARGE

## 2021-11-23 RX ADMIN — CEFTRIAXONE 100 MILLIGRAM(S): 500 INJECTION, POWDER, FOR SOLUTION INTRAMUSCULAR; INTRAVENOUS at 20:51

## 2021-11-23 RX ADMIN — SODIUM CHLORIDE 100 MILLILITER(S): 9 INJECTION, SOLUTION INTRAVENOUS at 23:32

## 2021-11-23 RX ADMIN — CEFTRIAXONE 100 MILLIGRAM(S): 500 INJECTION, POWDER, FOR SOLUTION INTRAMUSCULAR; INTRAVENOUS at 23:32

## 2021-11-23 RX ADMIN — SODIUM CHLORIDE 1000 MILLILITER(S): 9 INJECTION INTRAMUSCULAR; INTRAVENOUS; SUBCUTANEOUS at 19:56

## 2021-11-23 RX ADMIN — SENNA PLUS 2 TABLET(S): 8.6 TABLET ORAL at 22:59

## 2021-11-23 RX ADMIN — Medication 650 MILLIGRAM(S): at 20:47

## 2021-11-23 NOTE — H&P ADULT - NSICDXPASTMEDICALHX_GEN_ALL_CORE_FT
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

## 2021-11-23 NOTE — H&P ADULT - HISTORY OF PRESENT ILLNESS
56M, Mandarin speaking, PMHx HTN, DM2, cirrhosis, cholelithiasis s/p open CCY (18 years prior) with subsequent repeat episodes of cholangitis requiring ERCP with stone removals (9/2018, 10/2018, 2/24/2020), buccal ca s/p surgery and RT in [place] (2013) presenting with 2 weeks RUQ pain radiating to the back. Pain is worse with eating and is associated with fevers and subjective chills at home. After eating pain usually lasts about 5-6 hours, however the pain on admission is currently "faint." He has decreased his PO intake and on day of admission has only drank water. Pt states he has had multiple episodes as this in the past including 4 episodes in [facility] and about 5 episodes in the [place]. Pt denies any N/V, hematochezia, melena, hematemesis. However he does endorse constipation. States he went to Dr. Gina Fernandez (outpatient GI doc) who referred the patient to [facility] ED for further work up and evaluation. Pt denies chest pain, palpitations, SOB, diarrhea, dysuria, hematuria, confusion, h/a, syncope, dizziness.     Vitals: 99.7 --> 101.6, hr 83, 133/82, 18 98%  Labs: Hgb 12.4, platelet 88, PT 14.9, PTT 39.6, INR 1.26, Bili 2.1, , AST 51, ALT 47, Lipase 66,   EKG: NSR, RBBB, TWI V2-4  US Abdomen: Liver cirrhosis. 1.4 cm filling defect in the mid distal CBD which may represent stone, less likely tumor.  Interventions: 1L NS, CTX 1g, Flagyl 500, acetaminophen 650

## 2021-11-23 NOTE — H&P ADULT - PROBLEM SELECTOR PLAN 1
Pt with hx or repeat episode of cholangitis in the past requiring ERCP and stone removals. Pt with fever, RUQ pain, and mild scleral icterus on exam, no AMS or hypotension. Pt had open CCY 18 years prior. Follows with Dr. Gina Fernandez/Dr. Treviño from GI outpatient who recommended he come in for evaluation. S/p 1L NS in ED, CTX, and Flagyll.  - GI consulted follow up recs  - C/w CTX 1g and Flagyl 500mg   - Keep NPO per GI  - ERCP tomorrow with GI  - c/w home ursodiol

## 2021-11-23 NOTE — H&P ADULT - ASSESSMENT
56M, Mandarin speaking, PMHx HTN, DM2, cirrhosis, cholelithiasis s/p open CCY (18 years prior) with subsequent repeat episodes of cholangitis requiring ERCP with stone removals (9/2018, 10/2018, 2/24/2020), buccal ca s/p surgery and RT in [place] (2013) presenting with 2 weeks RUQ pain radiating to the clif with c/f cholangitis.

## 2021-11-23 NOTE — H&P ADULT - PROBLEM SELECTOR PLAN 2
Pt with history of cirrhosis for 12 years. Likely 2/2 to alcohol; however patient is unsure of dx of PBC as he also does take ursodiol. No asterixes on exam. MELD-Na 15 on admission <2% 90 day mortality. Unclear if patient has varices or is on nadolol 20mg for BP control.  - Labs for AM: hepatitis panel, anti-LKM, anti Sm, TAMIKO, AMA, iron studies, immunoglobulins panel, and ceruloplasmin   - change nadalol to propranolol 10 BID inpatient.   - Daily coags for MELD scoring    #Thrombocytopenia  - Likely in setting of cirrhosis is improved from previously   - No need for platelet transfusion prior to ERCP.

## 2021-11-23 NOTE — ED PROVIDER NOTE - OBJECTIVE STATEMENT
Mandarin, prefers sister at bedside to translate.  56M PMH HTN, Gallstones, s/p Open Cholecystectomy 18 years ago, subsequent stone/cholangitis and ERCP, oral ca s/p surgery in [place] 2013, & Let Hip Replacement 2017 ([facility]) now p/w abd pain. Has 2w of epigastric/RUQ pain, intermittent, worse w/ eating. Also several days of chills at night. Also darker colored urine. Went to Dr. Gina Fernandez and referred to ED. No current abd pain.  Denies HA, SOB/CP, URI symptoms, NVD, dysuria/frequency, back pain, focal weakness/numbness.

## 2021-11-23 NOTE — H&P ADULT - PROBLEM SELECTOR PLAN 3
Pt without signs or symptoms of bleeding. Previously was 15.7 now 12.6 on admission. Likely anemia of chronic disease.  - f/u AM iron studies

## 2021-11-23 NOTE — H&P ADULT - NSHPPHYSICALEXAM_GEN_ALL_CORE
PHYSICAL EXAM:    Vital Signs Last 24 Hrs  T(C): 37.1 (23 Nov 2021 21:35), Max: 38.7 (23 Nov 2021 20:00)  T(F): 98.8 (23 Nov 2021 21:35), Max: 101.6 (23 Nov 2021 20:00)  HR: 70 (23 Nov 2021 21:35) (70 - 83)  BP: 109/69 (23 Nov 2021 21:35) (109/69 - 133/82)  BP(mean): --  RR: 18 (23 Nov 2021 21:35) (18 - 18)  SpO2: 95% (23 Nov 2021 21:35) (95% - 98%)  I&O's Summary      General: NAD, warm to touch  HEENT: NC/AT; + mildly icteric sclera; moist mucosal membranes.  Neck: supple, trachea midline  Cardiovascular: RRR, +S1/S2; NO M/R/G  Respiratory: CTA B/L; no W/R/R  Gastrointestinal: soft, TTP over RUQ. + BS  Extremities: WWP; no edema or cyanosis  Vascular: 2+ radial, DP/PT pulses B/L  Neurological: AAOx3; no focal deficits PHYSICAL EXAM:    Vital Signs Last 24 Hrs  T(C): 37.1 (23 Nov 2021 21:35), Max: 38.7 (23 Nov 2021 20:00)  T(F): 98.8 (23 Nov 2021 21:35), Max: 101.6 (23 Nov 2021 20:00)  HR: 70 (23 Nov 2021 21:35) (70 - 83)  BP: 109/69 (23 Nov 2021 21:35) (109/69 - 133/82)  BP(mean): --  RR: 18 (23 Nov 2021 21:35) (18 - 18)  SpO2: 95% (23 Nov 2021 21:35) (95% - 98%)  I&O's Summary      General: NAD, warm to touch  HEENT: NC/AT; + mildly icteric sclera; moist mucosal membranes.  Neck: supple, trachea midline  Cardiovascular: RRR, +S1/S2; NO M/R/G  Respiratory: CTA B/L; no W/R/R  Gastrointestinal: soft, TTP over RUQ. + BS, no rebound, guarding rigidity. Abdomen with multiple old healed incisions sites.  Extremities: WWP; no edema or cyanosis  Vascular: 2+ radial, DP/PT pulses B/L  Neurological: AAOx3; no focal deficits

## 2021-11-23 NOTE — ED ADULT TRIAGE NOTE - HEIGHT IN FEET
5 Nasalis-Muscle-Based Myocutaneous Island Pedicle Flap Text: Using a #15 blade, an incision was made around the donor flap to the level of the nasalis muscle. Wide lateral undermining was then performed in both the subcutaneous plane above the nasalis muscle, and in a submuscular plane just above periosteum. This allowed the formation of a free nasalis muscle axial pedicle (based on the angular artery) which was still attached to the actual cutaneous flap, increasing its mobility and vascular viability. Hemostasis was obtained with pinpoint electrocoagulation. The flap was mobilized into position and the pivotal anchor points positioned and stabilized with buried interrupted sutures. Subcutaneous and dermal tissues were closed in a multilayered fashion with sutures. Tissue redundancies were excised, and the epidermal edges were apposed without significant tension and sutured with sutures.

## 2021-11-23 NOTE — H&P ADULT - ATTENDING COMMENTS
Mandarin  [id number]  reviewed pertinent data , h&p  patient seen and examined overnight   a/f recurring cholangitis   PE as above, except pt w/ very dry tongue, abdomen is warm to touch , pt reports mild RUQ tenderness , no guarding or rigidity though    1. cholangitis, recurring; c/w ceftriaxone, flagyl, on IVFs o/n, NPO pending GI evaluation ERCP  2. followup cirrhosis workup as above       rest of  plan as above

## 2021-11-23 NOTE — PATIENT PROFILE ADULT - VISION (WITH CORRECTIVE LENSES IF THE PATIENT USUALLY WEARS THEM):
"[facility] Medicine  History & Physical    Patient Name: Beatriz Adame  MRN: [id number]  Admission Date: 8/14/2017  Attending Physician: Sandeep Frost MD   Primary Care Provider: Medardo Paulino NP         Patient information was obtained from patient, past medical records and ER records.     Subjective:     Principal Problem:Stroke-like symptoms    Chief Complaint:   Chief Complaint   Patient presents with    Dizziness     x45 min, orthostatic in nature, ambulatory  for EMS w/ reported steady gait        HPI: Ms. Beatriz Adame is a 68 y.o. female, with PMH of CVA (no deficits), IDDM-II with neuropathy, HTN, HLP, who presented to the ED with symptoms reminiscent of her previous stroke on 8/14/17. She states when she awoke, her legs were numb and she was unable to move them to get out of bed. When she did get out of bed, she had a wobbly gait.  Additionally, she noted a crooked smile, with the left side of the mouth drooping, and had "sluggish speech." She denies light headedness, dizziness, headaches, vision changes from baseline (baseline is blurred vision 2/2 uncorrected nearsightedness), or baseline tinnitus.     Past Medical History:   Diagnosis Date    Arthritis     Diabetes mellitus     Encounter for blood transfusion     Hypercholesteremia     Hypertension     Stroke        Past Surgical History:   Procedure Laterality Date    TUBAL LIGATION         Review of patient's allergies indicates:   Allergen Reactions    Tomato (solanum lycopersicum) Hives and Itching       No current facility-administered medications on file prior to encounter.      Current Outpatient Prescriptions on File Prior to Encounter   Medication Sig    amitriptyline (ELAVIL) 25 MG tablet Take 1 tablet (25 mg total) by mouth nightly as needed for Insomnia.    carvedilol (COREG) 25 MG tablet Take 1 tablet (25 mg total) by mouth 2 (two) times daily.    gabapentin (NEURONTIN) 300 MG capsule Take 1 " capsule (300 mg total) by mouth 3 (three) times daily.    insulin glargine (LANTUS SOLOSTAR) 100 unit/mL (3 mL) InPn pen Inject 15 Units into the skin every evening. (Patient taking differently: Inject 30 Units into the skin every evening. )    insulin lispro (HUMALOG KWIKPEN) 100 unit/mL InPn pen Inject 3 Units into the skin 3 (three) times daily before meals. (Patient taking differently: Inject 5 Units into the skin 3 (three) times daily before meals. )    [DISCONTINUED] amlodipine (NORVASC) 2.5 MG tablet Take 10 mg by mouth once daily.     [DISCONTINUED] ondansetron (ZOFRAN-ODT) 4 MG TbDL Take 1 tablet (4 mg total) by mouth every 8 (eight) hours as needed (nausea).     Family History     Problem Relation (Age of Onset)    Alcohol abuse Father    Arthritis Mother    Asthma Brother    Diabetes Mother, Sister, Brother    Early death Brother    Heart disease Mother, Brother    Hypertension Mother    Kidney disease Mother    Stroke Mother    Vision loss Mother        Social History Main Topics    Smoking status: Never Smoker    Smokeless tobacco: Never Used    Alcohol use No      Comment: socially    Drug use: No    Sexual activity: Not Currently     Review of Systems   Constitutional: Negative for chills, diaphoresis and fever.   HENT: Positive for tinnitus. Negative for trouble swallowing and voice change.    Eyes: Negative for photophobia and visual disturbance.   Respiratory: Negative for cough, shortness of breath and wheezing.    Cardiovascular: Negative for chest pain, palpitations and leg swelling.   Gastrointestinal: Negative for abdominal pain, constipation, diarrhea, nausea and vomiting.   Genitourinary: Negative for enuresis.   Neurological: Positive for facial asymmetry, speech difficulty and weakness. Negative for dizziness, syncope, light-headedness, numbness and headaches.     Objective:     Vital Signs (Most Recent):  Temp: 98.8 °F (37.1 °C) (08/14/17 1117)  Pulse: 78 (08/14/17 1526)  Resp:  16 (08/14/17 1526)  BP: (!) 159/70 (08/14/17 1526)  SpO2: 99 % (08/14/17 1526) Vital Signs (24h Range):  Temp:  [98.8 °F (37.1 °C)] 98.8 °F (37.1 °C)  Pulse:  [74-88] 78  Resp:  [16-18] 16  SpO2:  [99 %-100 %] 99 %  BP: (136-168)/(65-78) 159/70     Weight: 72.6 kg (160 lb)  Body mass index is 25.06 kg/m².    Physical Exam   Constitutional: She is oriented to person, place, and time. She appears well-developed and well-nourished. No distress.   HENT:   Head: Normocephalic and atraumatic.   Eyes: Conjunctivae and EOM are normal. Pupils are equal, round, and reactive to light. No scleral icterus.   Neck: Normal range of motion. Neck supple. No JVD present. No tracheal deviation present.   Cardiovascular: Normal rate, regular rhythm, normal heart sounds and intact distal pulses.  Exam reveals no gallop and no friction rub.    No murmur heard.  Pulmonary/Chest: Effort normal and breath sounds normal. No stridor. No respiratory distress. She has no wheezes. She has no rales.   Abdominal: Soft. Bowel sounds are normal. She exhibits no distension. There is no tenderness. There is no guarding.   Musculoskeletal: Normal range of motion. She exhibits no deformity.   Neurological: She is alert and oriented to person, place, and time. A cranial nerve deficit (left sided mouth droop) is present. She exhibits normal muscle tone. Coordination normal.   The patient was alert, relaxed and cooperative with coherent thought process. AAOx4. CN II-XII were intact. The patient had good muscle bulk and tone with 5/5 strength throughout. Good finger-to-nose task ability. Gait had a shortened and shuffling base. Romberg was normal. Sensation was intact to light touch.     Skin: Skin is warm and dry. No rash noted. She is not diaphoretic. No erythema. No pallor.   Psychiatric: She has a normal mood and affect. Her behavior is normal. Judgment and thought content normal.   Nursing note and vitals reviewed.       Significant Labs:   BMP:    Recent Labs  Lab 08/14/17  1112   *      K 4.2      CO2 24   BUN 18   CREATININE 1.4   CALCIUM 9.6     CBC:   Recent Labs  Lab 08/14/17  1112   WBC 12.29   HGB 11.1*   HCT 33.1*        CMP:   Recent Labs  Lab 08/14/17  1112      K 4.2      CO2 24   *   BUN 18   CREATININE 1.4   CALCIUM 9.6   PROT 7.7   ALBUMIN 3.3*   BILITOT 0.7   ALKPHOS 104   AST 16   ALT 16   ANIONGAP 14   EGFRNONAA 39*     All pertinent labs within the past 24 hours have been reviewed.    Significant Imaging: I have reviewed all pertinent imaging results/findings within the past 24 hours.    Assessment/Plan:     Yeast infection    - likely 2/2 hyperglycemia with glycosuria of 3+   - Diflucan ordered           Type 2 diabetes mellitus with hyperosmolarity, uncontrolled    -   Lab Results   Component Value Date    HGBA1C 11.9 (H) 10/28/2016     -likely 2/2 poor compliance with medications and diet   - blood glucose upon admission 346, no DKA (no anion gap)  - 1L NS administered in ED   - 2nd L NS to be administered upon admission, followed by NS running at 75cc/hour  - diabetic diet  - SSRI   - accuchecks AC/HS  - A1C pending          HLD (hyperlipidemia)    -   Lab Results   Component Value Date    CHOL 210 (H) 08/14/2017    CHOL 225 (H) 06/02/2014    CHOL 254 (H) 01/19/2013     Lab Results   Component Value Date    HDL 43 08/14/2017    HDL 41 06/02/2014    HDL 43 01/19/2013     Lab Results   Component Value Date    LDLCALC 123.2 08/14/2017    LDLCALC 155.4 06/02/2014    LDLCALC 185.0 (H) 01/19/2013     Lab Results   Component Value Date    TRIG 219 (H) 08/14/2017    TRIG 143 06/02/2014    TRIG 131 01/19/2013     Lab Results   Component Value Date    CHOLHDL 20.5 08/14/2017    CHOLHDL 18.2 (L) 06/02/2014    CHOLHDL 16.9 (L) 01/19/2013     - continue home meds: atorvastatin (LIPITOR) 80 MG tablet QD           HTN (hypertension)    - moderately adequate control   - continue home meds:    - amlodipine  (NORVASC) 10 MG tablet QD    - carvedilol (COREG) 25 MG tablet BID   - hydralazine for SBP >170        * Stroke-like symptoms    - including left mouth droop, change in gait   - CT of head negative   - MRI brain / MRA Brain without contrast / MRA neck with contrast    - Neuro consult pending            VTE Risk Mitigation         Ordered     enoxaparin injection 40 mg  Daily     Route:  Subcutaneous        08/14/17 1632     Medium Risk of VTE  Once      08/14/17 1632     Place sequential compression device  Until discontinued      08/14/17 1632     Place DEMETRIA hose  Until discontinued      08/14/17 1632             Mariangel Moreira PA-C  Department of Hospital Medicine   [facility]   Normal vision: sees adequately in most situations; can see medication labels, newsprint

## 2021-11-23 NOTE — PATIENT PROFILE ADULT - FUNCTIONAL SCREEN CURRENT LEVEL: SWALLOWING (IF SCORE 2 OR MORE FOR ANY ITEM, CONSULT REHAB SERVICES), MLM)
0 = swallows foods/liquids without difficulty needs a straw for drinking liquids/2 = difficulty swallowing liquids

## 2021-11-23 NOTE — H&P ADULT - NSHPSOCIALHISTORY_GEN_ALL_CORE
Lives alone. Quit alcohol and tobacco 12 years ago after dx of cirrhosis. Previously drank 1/2 bottle per day. No recreational drug use.

## 2021-11-23 NOTE — ED ADULT TRIAGE NOTE - OTHER COMPLAINTS
patient c/o abdominal pain with hx pyogenic cholangitis, left buccal CA-- c/o  abdominal pain and dark color urine x 2 weeks

## 2021-11-23 NOTE — ED PROVIDER NOTE - CLINICAL SUMMARY MEDICAL DECISION MAKING FREE TEXT BOX
56M PMH HTN, Gallstones, s/p Open Cholecystectomy 18 years ago, subsequent stone/cholangitis and ERCP, oral ca s/p surgery in [place] 2013, & Let Hip Replacement 2017 ([facility]) now p/w abd pain. Has 2w of epigastric/RUQ pain, intermittent, worse w/ eating. Also several days of chills at night. Also darker colored urine. Went to Dr. Gina Fernandez and referred to ED. No current abd pain.  99.7 oral temp, other vitals wnl. Exam as above.  ddx: ?recurrent cholangitis vs. CBD stone.   Labs, US, IVF, rectal temp, reassess.

## 2021-11-23 NOTE — H&P ADULT - PROBLEM SELECTOR PLAN 6
Pt with buccal ca s/p resection and RT in the past. Not on any chemotherapy medications. Pt mildly dysphasic on exam 2/2 resection. Likely difficult intubation.  - may need S&S when able to eat after procedure

## 2021-11-23 NOTE — ED ADULT NURSE NOTE - NSIMPLEMENTINTERV_GEN_ALL_ED
Implemented All Universal Safety Interventions:  [place] to call system. Call bell, personal items and telephone within reach. Instruct patient to call for assistance. Room bathroom lighting operational. Non-slip footwear when patient is off stretcher. Physically safe environment: no spills, clutter or unnecessary equipment. Stretcher in lowest position, wheels locked, appropriate side rails in place.

## 2021-11-23 NOTE — ED PROVIDER NOTE - PROGRESS NOTE DETAILS
[place]: Hgb 12.4, platelets 88, bili 2.1, alk phos 138, AST 51, ALT 47, lipase 66. Other labs grossly wnl. COVID neg. US pending.   Given transaminitis, oral temp 99.7, chills, will tx for possible cholangitis.   GI consulted. Will reassess. [place]: rectal temp 101.6. US showing "1.4 cm filling defect in the mid distal CBD which may represent stone, less likely tumor." Pt remains well appearing, other vitals wnl. Updated GI fellow. NPO for now, possible intervention tonight. Fellow to d/w attending. will admit medicine for further care. updated pt/sister.

## 2021-11-23 NOTE — H&P ADULT - NSHPLABSRESULTS_GEN_ALL_CORE
LABS:                         12.4   4.11  )-----------( 88       ( 23 Nov 2021 18:44 )             35.5     11-23    136  |  98  |  11  ----------------------------<  112<H>  3.8   |  26  |  0.68    Ca    9.4      23 Nov 2021 18:44    TPro  7.9  /  Alb  3.9  /  TBili  2.1<H>  /  DBili  1.2<H>  /  AST  51<H>  /  ALT  47<H>  /  AlkPhos  138<H>  11-23    PT/INR - ( 23 Nov 2021 18:44 )   PT: 14.9 sec;   INR: 1.26          PTT - ( 23 Nov 2021 18:44 )  PTT:39.6 sec        CAPILLARY BLOOD GLUCOSE          Lactate, Blood: 1.4 mmol/L (11-23 @ 18:44)      RADIOLOGY, EKG & ADDITIONAL TESTS:

## 2021-11-24 ENCOUNTER — TRANSCRIPTION ENCOUNTER (OUTPATIENT)
Age: 56
End: 2021-11-24

## 2021-11-24 LAB
A1C WITH ESTIMATED AVERAGE GLUCOSE RESULT: 6.2 % — HIGH (ref 4–5.6)
ALBUMIN SERPL ELPH-MCNC: 3.4 G/DL — SIGNIFICANT CHANGE UP (ref 3.3–5)
ALP SERPL-CCNC: 143 U/L — HIGH (ref 40–120)
ALT FLD-CCNC: 41 U/L — SIGNIFICANT CHANGE UP (ref 10–45)
ANION GAP SERPL CALC-SCNC: 17 MMOL/L — SIGNIFICANT CHANGE UP (ref 5–17)
ANISOCYTOSIS BLD QL: SLIGHT — SIGNIFICANT CHANGE UP
APTT BLD: 31.8 SEC — SIGNIFICANT CHANGE UP (ref 27.5–35.5)
AST SERPL-CCNC: 55 U/L — HIGH (ref 10–40)
BASOPHILS # BLD AUTO: 0 K/UL — SIGNIFICANT CHANGE UP (ref 0–0.2)
BASOPHILS NFR BLD AUTO: 0 % — SIGNIFICANT CHANGE UP (ref 0–2)
BILIRUB DIRECT SERPL-MCNC: 3.1 MG/DL — HIGH (ref 0–0.3)
BILIRUB INDIRECT FLD-MCNC: 0.8 MG/DL — SIGNIFICANT CHANGE UP (ref 0.2–1)
BILIRUB SERPL-MCNC: 3.9 MG/DL — HIGH (ref 0.2–1.2)
BLD GP AB SCN SERPL QL: NEGATIVE — SIGNIFICANT CHANGE UP
BUN SERPL-MCNC: 8 MG/DL — SIGNIFICANT CHANGE UP (ref 7–23)
CALCIUM SERPL-MCNC: 8.6 MG/DL — SIGNIFICANT CHANGE UP (ref 8.4–10.5)
CERULOPLASMIN SERPL-MCNC: 34 MG/DL — HIGH (ref 15–30)
CHLORIDE SERPL-SCNC: 96 MMOL/L — SIGNIFICANT CHANGE UP (ref 96–108)
CO2 SERPL-SCNC: 20 MMOL/L — LOW (ref 22–31)
COVID-19 NUCLEOCAPSID GAM AB INTERP: POSITIVE
COVID-19 NUCLEOCAPSID TOTAL GAM ANTIBODY RESULT: 8.34 INDEX — HIGH
COVID-19 SPIKE DOMAIN AB INTERP: POSITIVE
COVID-19 SPIKE DOMAIN ANTIBODY RESULT: >250 U/ML — HIGH
CREAT SERPL-MCNC: 0.56 MG/DL — SIGNIFICANT CHANGE UP (ref 0.5–1.3)
EOSINOPHIL # BLD AUTO: 0 K/UL — SIGNIFICANT CHANGE UP (ref 0–0.5)
EOSINOPHIL NFR BLD AUTO: 0 % — SIGNIFICANT CHANGE UP (ref 0–6)
ESTIMATED AVERAGE GLUCOSE: 131 MG/DL — HIGH (ref 68–114)
FERRITIN SERPL-MCNC: 860 NG/ML — HIGH (ref 30–400)
GIANT PLATELETS BLD QL SMEAR: PRESENT — SIGNIFICANT CHANGE UP
GLUCOSE BLDC GLUCOMTR-MCNC: 127 MG/DL — HIGH (ref 70–99)
GLUCOSE BLDC GLUCOMTR-MCNC: 133 MG/DL — HIGH (ref 70–99)
GLUCOSE BLDC GLUCOMTR-MCNC: 186 MG/DL — HIGH (ref 70–99)
GLUCOSE SERPL-MCNC: 131 MG/DL — HIGH (ref 70–99)
HCT VFR BLD CALC: 35.8 % — LOW (ref 39–50)
HGB BLD-MCNC: 12.8 G/DL — LOW (ref 13–17)
HYPOCHROMIA BLD QL: SLIGHT — SIGNIFICANT CHANGE UP
IGA FLD-MCNC: 290 MG/DL — SIGNIFICANT CHANGE UP (ref 84–499)
IGG FLD-MCNC: 1441 MG/DL — SIGNIFICANT CHANGE UP (ref 610–1660)
IGM SERPL-MCNC: 87 MG/DL — SIGNIFICANT CHANGE UP (ref 35–242)
INR BLD: 1.24 — HIGH (ref 0.88–1.16)
IRON SATN MFR SERPL: 20 % — SIGNIFICANT CHANGE UP (ref 16–55)
IRON SATN MFR SERPL: 37 UG/DL — LOW (ref 45–165)
KAPPA LC SER QL IFE: 2.48 MG/DL — HIGH (ref 0.33–1.94)
KAPPA/LAMBDA FREE LIGHT CHAIN RATIO, SERUM: 0.85 RATIO — SIGNIFICANT CHANGE UP (ref 0.26–1.65)
LAMBDA LC SER QL IFE: 2.92 MG/DL — HIGH (ref 0.57–2.63)
LYMPHOCYTES # BLD AUTO: 0.04 K/UL — LOW (ref 1–3.3)
LYMPHOCYTES # BLD AUTO: 0.9 % — LOW (ref 13–44)
MANUAL SMEAR VERIFICATION: SIGNIFICANT CHANGE UP
MCHC RBC-ENTMCNC: 32.3 PG — SIGNIFICANT CHANGE UP (ref 27–34)
MCHC RBC-ENTMCNC: 35.8 GM/DL — SIGNIFICANT CHANGE UP (ref 32–36)
MCV RBC AUTO: 90.4 FL — SIGNIFICANT CHANGE UP (ref 80–100)
MONOCYTES # BLD AUTO: 0.41 K/UL — SIGNIFICANT CHANGE UP (ref 0–0.9)
MONOCYTES NFR BLD AUTO: 8.8 % — SIGNIFICANT CHANGE UP (ref 2–14)
NEUTROPHILS # BLD AUTO: 4.23 K/UL — SIGNIFICANT CHANGE UP (ref 1.8–7.4)
NEUTROPHILS NFR BLD AUTO: 82.4 % — HIGH (ref 43–77)
NEUTS BAND # BLD: 7.9 % — SIGNIFICANT CHANGE UP (ref 0–8)
OVALOCYTES BLD QL SMEAR: SLIGHT — SIGNIFICANT CHANGE UP
PLAT MORPH BLD: ABNORMAL
PLATELET # BLD AUTO: 99 K/UL — LOW (ref 150–400)
POIKILOCYTOSIS BLD QL AUTO: SLIGHT — SIGNIFICANT CHANGE UP
POTASSIUM SERPL-MCNC: 3.8 MMOL/L — SIGNIFICANT CHANGE UP (ref 3.5–5.3)
POTASSIUM SERPL-SCNC: 3.8 MMOL/L — SIGNIFICANT CHANGE UP (ref 3.5–5.3)
PROT SERPL-MCNC: 7.1 G/DL — SIGNIFICANT CHANGE UP (ref 6–8.3)
PROTHROM AB SERPL-ACNC: 14.7 SEC — HIGH (ref 10.6–13.6)
RBC # BLD: 3.96 M/UL — LOW (ref 4.2–5.8)
RBC # FLD: 12.8 % — SIGNIFICANT CHANGE UP (ref 10.3–14.5)
RBC BLD AUTO: ABNORMAL
RH IG SCN BLD-IMP: POSITIVE — SIGNIFICANT CHANGE UP
SARS-COV-2 IGG+IGM SERPL QL IA: 8.34 INDEX — HIGH
SARS-COV-2 IGG+IGM SERPL QL IA: >250 U/ML — HIGH
SARS-COV-2 IGG+IGM SERPL QL IA: POSITIVE
SARS-COV-2 IGG+IGM SERPL QL IA: POSITIVE
SODIUM SERPL-SCNC: 133 MMOL/L — LOW (ref 135–145)
TIBC SERPL-MCNC: 188 UG/DL — LOW (ref 220–430)
UIBC SERPL-MCNC: 151 UG/DL — SIGNIFICANT CHANGE UP (ref 110–370)
WBC # BLD: 4.68 K/UL — SIGNIFICANT CHANGE UP (ref 3.8–10.5)
WBC # FLD AUTO: 4.68 K/UL — SIGNIFICANT CHANGE UP (ref 3.8–10.5)

## 2021-11-24 PROCEDURE — 74328 X-RAY BILE DUCT ENDOSCOPY: CPT | Mod: 26

## 2021-11-24 PROCEDURE — 43277 ERCP EA DUCT/AMPULLA DILATE: CPT | Mod: 59

## 2021-11-24 PROCEDURE — 43264 ERCP REMOVE DUCT CALCULI: CPT

## 2021-11-24 PROCEDURE — 99232 SBSQ HOSP IP/OBS MODERATE 35: CPT | Mod: 25

## 2021-11-24 PROCEDURE — 99233 SBSQ HOSP IP/OBS HIGH 50: CPT | Mod: 25,GC

## 2021-11-24 RX ORDER — CEFTRIAXONE 500 MG/1
2000 INJECTION, POWDER, FOR SOLUTION INTRAMUSCULAR; INTRAVENOUS EVERY 24 HOURS
Refills: 0 | Status: DISCONTINUED | OUTPATIENT
Start: 2021-11-24 | End: 2021-11-25

## 2021-11-24 RX ORDER — ENOXAPARIN SODIUM 100 MG/ML
40 INJECTION SUBCUTANEOUS EVERY 24 HOURS
Refills: 0 | Status: DISCONTINUED | OUTPATIENT
Start: 2021-11-24 | End: 2021-11-25

## 2021-11-24 RX ORDER — TRAMADOL HYDROCHLORIDE 50 MG/1
25 TABLET ORAL ONCE
Refills: 0 | Status: DISCONTINUED | OUTPATIENT
Start: 2021-11-24 | End: 2021-11-24

## 2021-11-24 RX ORDER — CEFTRIAXONE 500 MG/1
2000 INJECTION, POWDER, FOR SOLUTION INTRAMUSCULAR; INTRAVENOUS EVERY 24 HOURS
Refills: 0 | Status: DISCONTINUED | OUTPATIENT
Start: 2021-11-24 | End: 2021-11-24

## 2021-11-24 RX ADMIN — Medication 1: at 12:12

## 2021-11-24 RX ADMIN — URSODIOL 500 MILLIGRAM(S): 250 TABLET, FILM COATED ORAL at 07:19

## 2021-11-24 RX ADMIN — Medication 100 MILLIGRAM(S): at 07:19

## 2021-11-24 RX ADMIN — POLYETHYLENE GLYCOL 3350 17 GRAM(S): 17 POWDER, FOR SOLUTION ORAL at 12:14

## 2021-11-24 RX ADMIN — Medication 100 MILLIGRAM(S): at 15:35

## 2021-11-24 RX ADMIN — Medication 100 MILLIGRAM(S): at 00:46

## 2021-11-24 RX ADMIN — CEFTRIAXONE 100 MILLIGRAM(S): 500 INJECTION, POWDER, FOR SOLUTION INTRAMUSCULAR; INTRAVENOUS at 21:50

## 2021-11-24 RX ADMIN — TRAMADOL HYDROCHLORIDE 25 MILLIGRAM(S): 50 TABLET ORAL at 05:56

## 2021-11-24 RX ADMIN — Medication 650 MILLIGRAM(S): at 04:35

## 2021-11-24 RX ADMIN — Medication 100 MILLIGRAM(S): at 23:50

## 2021-11-24 RX ADMIN — ENOXAPARIN SODIUM 40 MILLIGRAM(S): 100 INJECTION SUBCUTANEOUS at 21:50

## 2021-11-24 NOTE — CONSULT NOTE ADULT - ASSESSMENT
56M, Mandarin speaking, PMHx HTN, DM2, cirrhosis, cholelithiasis s/p open CCY (18 years prior) with subsequent repeat episodes of cholangitis requiring ERCP with stone removals (9/2018, 10/2018, 2/24/2020), buccal ca s/p surgery and RT in [place] (2013) presenting with 2 weeks RUQ pain radiating to the back.      #Dilated CBD  Patient presenting to [facility] ED with complaints of 2 weeks of RUQ pain radiating to the back with associated intermittent subjective fevers/chills at home. Found to be febrile to 101.6F in the ED, no leukocytosis or lactate however abdominal US revealing 1.4 cm filling defect in the mid distal CBD which may represent stone, less likely tumor. With prior ERCPs and EGDs, most recent of which was in 2020 (findings as noted below). As per [place] 2018 guidelines, meeting suspected grade 1 mild acute cholangitis.  -ERCP (Feb 2020): Choledocolithiasis. Removed via balloon sweep. Spyglass performed demonstrating abnormal tissue in left hepatic system. Biopsied – negative for malignant cells  -EGD (2019) - Plastic stent in the duodenum. Removed. 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    -c/w broad spectrum abxs, Ceftriaxone & Flagyl  -f/u Blood cultures (11/23)   -Continue to closely monitor LTs and total bilirubin with daily CMPs  -Plan for ERCP today (11/24) with Dr Treviño    Case discussed with [facility] attending and primary team.     Kelly Leyva DO  Gastroenterology Fellow  Pager: [phone]

## 2021-11-24 NOTE — CONSULT NOTE ADULT - SUBJECTIVE AND OBJECTIVE BOX
HPI:  56M, Mandarin speaking, PMHx HTN, DM2, cirrhosis, cholelithiasis s/p open CCY (18 years prior) with subsequent repeat episodes of cholangitis requiring ERCP with stone removals (9/2018, 10/2018, 2/24/2020), buccal ca s/p surgery and RT in [place] (2013) presenting with 2 weeks RUQ pain radiating to the back. Pain is worse with eating and is associated with fevers and subjective chills at home. After eating pain usually lasts about 5-6 hours, however the pain on admission is currently "faint." He has decreased his PO intake and on day of admission has only drank water. Pt states he has had multiple episodes as this in the past including 4 episodes in [facility] and about 5 episodes in the [place]. Pt denies any N/V, hematochezia, melena, hematemesis. However he does endorse constipation. States he went to Dr. Gina Fernandez (outpatient GI doc) who referred the patient to [facility] ED for further work up and evaluation. Pt denies chest pain, palpitations, SOB, diarrhea, dysuria, hematuria, confusion, h/a, syncope, dizziness.     Vitals: 99.7 --> 101.6, hr 83, 133/82, 18 98%  Labs: Hgb 12.4, platelet 88, PT 14.9, PTT 39.6, INR 1.26, Bili 2.1, , AST 51, ALT 47, Lipase 66,   EKG: NSR, RBBB, TWI V2-4  US Abdomen: Liver cirrhosis. 1.4 cm filling defect in the mid distal CBD which may represent stone, less likely tumor.  Interventions: 1L NS, CTX 1g, Flagyl 500, acetaminophen 650 (23 Nov 2021 21:51)    GI consulted for dilated CBD and consideration for ERCP.    Allergies    No Known Allergies    Intolerances      MEDICATIONS:  MEDICATIONS  (STANDING):  cefTRIAXone   IVPB 2000 milliGRAM(s) IV Intermittent every 24 hours  dextrose 40% Gel 15 Gram(s) Oral once  dextrose 5%. 1000 milliLiter(s) (50 mL/Hr) IV Continuous <Continuous>  dextrose 5%. 1000 milliLiter(s) (100 mL/Hr) IV Continuous <Continuous>  dextrose 50% Injectable 25 Gram(s) IV Push once  dextrose 50% Injectable 12.5 Gram(s) IV Push once  dextrose 50% Injectable 25 Gram(s) IV Push once  glucagon  Injectable 1 milliGRAM(s) IntraMuscular once  insulin lispro (ADMELOG) corrective regimen sliding scale   SubCutaneous every 6 hours  lactated ringers. 1000 milliLiter(s) (100 mL/Hr) IV Continuous <Continuous>  metroNIDAZOLE  IVPB 500 milliGRAM(s) IV Intermittent every 8 hours  polyethylene glycol 3350 17 Gram(s) Oral daily  propranolol 10 milliGRAM(s) Oral two times a day  senna 2 Tablet(s) Oral at bedtime  ursodiol Tablet 500 milliGRAM(s) Oral <User Schedule>    MEDICATIONS  (PRN):  acetaminophen     Tablet .. 650 milliGRAM(s) Oral every 6 hours PRN Temp greater or equal to 38C (100.4F), Mild Pain (1 - 3)    PAST MEDICAL & SURGICAL HISTORY:  HTN (hypertension)    Oral-mouth cancer  L buccal ca s/p resection and RT    History of gallstones    Diabetes mellitus    Cirrhosis    Cholangitis  recurrent    FH: cholecystectomy    History of hip surgery  2017 left hip replacement    Oral-mouth cancer  surgery 2013      FAMILY HISTORY:  Family history of diabetes mellitus (DM)    FH: HTN (hypertension)    Family history of stroke      SOCIAL HISTORY:  [place]: [ ] Current, [ ] Former, [ ] Never; Pack Years:  Alcohol:  Illicit Drugs:    REVIEW OF SYSTEMS:  Constitutional: No fever, chills, weight loss, or fatigue  ENMT:  No visual changes; No difficulty hearing, tinnitus, vertigo; No sinus or throat pain  Neck: No pain or stiffness  Respiratory: No cough, wheezing, or hemoptysis; No shortness of breath  Cardiovascular: No chest pain, palpitations, dizziness, orthopnea, PND, or leg swelling  Gastrointestinal: No abdominal or epigastric pain. No  nausea, vomiting, or hematemesis. No diarrhea, constipation, or steatorrhea. No melena or hematochezia  Genitourinary: No dysuria, urinary frequency/hesitancy, or hematuria  Skin: No itching, burning, rashes or lesions   Musculoskeletal: No joint pain or swelling; No muscle, back or extremity pain    Vital Signs Last 24 Hrs  T(C): 36.6 (24 Nov 2021 11:02), Max: 38.7 (23 Nov 2021 20:00)  T(F): 97.9 (24 Nov 2021 11:02), Max: 101.6 (23 Nov 2021 20:00)  HR: 64 (24 Nov 2021 11:02) (64 - 83)  BP: 99/65 (24 Nov 2021 11:02) (99/65 - 133/82)  BP(mean): --  RR: 18 (24 Nov 2021 11:02) (18 - 18)  SpO2: 95% (24 Nov 2021 11:02) (95% - 98%)      PHYSICAL EXAM:    General: Well developed; well nourished; in no acute distress  HEENT: MMM, conjunctiva and sclera clear  Gastrointestinal: Soft, non-tender non-distended; Normal bowel sounds; No rebound or guarding  Extremities: Normal range of motion, No clubbing, cyanosis or edema  Neurological: Alert and oriented x3  Skin: Warm and dry. No obvious rash    LABS:                        12.8   4.68  )-----------( 99       ( 24 Nov 2021 07:02 )             35.8     11-24    133<L>  |  96  |  8   ----------------------------<  131<H>  3.8   |  20<L>  |  0.56    Ca    8.6      24 Nov 2021 07:02    TPro  7.1  /  Alb  3.4  /  TBili  3.9<H>  /  DBili  3.1<H>  /  AST  55<H>  /  ALT  41  /  AlkPhos  143<H>  11-24      Culture Results:   No growth at 12 hours (11-23 @ 19:34)  Culture Results:   No growth at 12 hours (11-23 @ 19:34)    RADIOLOGY & ADDITIONAL STUDIES:

## 2021-11-25 ENCOUNTER — TRANSCRIPTION ENCOUNTER (OUTPATIENT)
Age: 56
End: 2021-11-25

## 2021-11-25 VITALS
OXYGEN SATURATION: 96 % | SYSTOLIC BLOOD PRESSURE: 98 MMHG | RESPIRATION RATE: 18 BRPM | HEART RATE: 75 BPM | TEMPERATURE: 97 F | DIASTOLIC BLOOD PRESSURE: 64 MMHG

## 2021-11-25 LAB
ALBUMIN SERPL ELPH-MCNC: 2.9 G/DL — LOW (ref 3.3–5)
ALP SERPL-CCNC: 112 U/L — SIGNIFICANT CHANGE UP (ref 40–120)
ALT FLD-CCNC: 33 U/L — SIGNIFICANT CHANGE UP (ref 10–45)
ANION GAP SERPL CALC-SCNC: 11 MMOL/L — SIGNIFICANT CHANGE UP (ref 5–17)
AST SERPL-CCNC: 35 U/L — SIGNIFICANT CHANGE UP (ref 10–40)
BASOPHILS # BLD AUTO: 0 K/UL — SIGNIFICANT CHANGE UP (ref 0–0.2)
BASOPHILS NFR BLD AUTO: 0 % — SIGNIFICANT CHANGE UP (ref 0–2)
BILIRUB SERPL-MCNC: 1.4 MG/DL — HIGH (ref 0.2–1.2)
BUN SERPL-MCNC: 6 MG/DL — LOW (ref 7–23)
CALCIUM SERPL-MCNC: 8.2 MG/DL — LOW (ref 8.4–10.5)
CHLORIDE SERPL-SCNC: 103 MMOL/L — SIGNIFICANT CHANGE UP (ref 96–108)
CO2 SERPL-SCNC: 23 MMOL/L — SIGNIFICANT CHANGE UP (ref 22–31)
CREAT SERPL-MCNC: 0.65 MG/DL — SIGNIFICANT CHANGE UP (ref 0.5–1.3)
EOSINOPHIL # BLD AUTO: 0.03 K/UL — SIGNIFICANT CHANGE UP (ref 0–0.5)
EOSINOPHIL NFR BLD AUTO: 0.9 % — SIGNIFICANT CHANGE UP (ref 0–6)
GIANT PLATELETS BLD QL SMEAR: PRESENT — SIGNIFICANT CHANGE UP
GLUCOSE BLDC GLUCOMTR-MCNC: 105 MG/DL — HIGH (ref 70–99)
GLUCOSE BLDC GLUCOMTR-MCNC: 106 MG/DL — HIGH (ref 70–99)
GLUCOSE BLDC GLUCOMTR-MCNC: 219 MG/DL — HIGH (ref 70–99)
GLUCOSE SERPL-MCNC: 113 MG/DL — HIGH (ref 70–99)
HAV IGM SER-ACNC: SIGNIFICANT CHANGE UP
HBV CORE AB SER-ACNC: REACTIVE
HBV CORE IGM SER-ACNC: SIGNIFICANT CHANGE UP
HBV SURFACE AB SER-ACNC: REACTIVE
HBV SURFACE AG SER-ACNC: SIGNIFICANT CHANGE UP
HCT VFR BLD CALC: 32.3 % — LOW (ref 39–50)
HCV AB S/CO SERPL IA: 0.05 S/CO — SIGNIFICANT CHANGE UP
HCV AB SERPL-IMP: SIGNIFICANT CHANGE UP
HGB BLD-MCNC: 11.2 G/DL — LOW (ref 13–17)
INR BLD: 1.3 — HIGH (ref 0.88–1.16)
LYMPHOCYTES # BLD AUTO: 0.27 K/UL — LOW (ref 1–3.3)
LYMPHOCYTES # BLD AUTO: 8.9 % — LOW (ref 13–44)
MACROCYTES BLD QL: SLIGHT — SIGNIFICANT CHANGE UP
MAGNESIUM SERPL-MCNC: 1.8 MG/DL — SIGNIFICANT CHANGE UP (ref 1.6–2.6)
MANUAL SMEAR VERIFICATION: SIGNIFICANT CHANGE UP
MCHC RBC-ENTMCNC: 31.7 PG — SIGNIFICANT CHANGE UP (ref 27–34)
MCHC RBC-ENTMCNC: 34.7 GM/DL — SIGNIFICANT CHANGE UP (ref 32–36)
MCV RBC AUTO: 91.5 FL — SIGNIFICANT CHANGE UP (ref 80–100)
MELD SCORE WITH DIALYSIS: 24 POINTS — SIGNIFICANT CHANGE UP
MELD SCORE WITHOUT DIALYSIS: 11 POINTS — SIGNIFICANT CHANGE UP
MICROCYTES BLD QL: SLIGHT — SIGNIFICANT CHANGE UP
MONOCYTES # BLD AUTO: 0.19 K/UL — SIGNIFICANT CHANGE UP (ref 0–0.9)
MONOCYTES NFR BLD AUTO: 6.3 % — SIGNIFICANT CHANGE UP (ref 2–14)
NEUTROPHILS # BLD AUTO: 2.54 K/UL — SIGNIFICANT CHANGE UP (ref 1.8–7.4)
NEUTROPHILS NFR BLD AUTO: 83 % — HIGH (ref 43–77)
OVALOCYTES BLD QL SMEAR: SLIGHT — SIGNIFICANT CHANGE UP
PHOSPHATE SERPL-MCNC: 2.5 MG/DL — SIGNIFICANT CHANGE UP (ref 2.5–4.5)
PLAT MORPH BLD: ABNORMAL
PLATELET # BLD AUTO: 99 K/UL — LOW (ref 150–400)
POLYCHROMASIA BLD QL SMEAR: SLIGHT — SIGNIFICANT CHANGE UP
POTASSIUM SERPL-MCNC: 3.8 MMOL/L — SIGNIFICANT CHANGE UP (ref 3.5–5.3)
POTASSIUM SERPL-SCNC: 3.8 MMOL/L — SIGNIFICANT CHANGE UP (ref 3.5–5.3)
PROT SERPL-MCNC: 6.2 G/DL — SIGNIFICANT CHANGE UP (ref 6–8.3)
PROTHROM AB SERPL-ACNC: 15.4 SEC — HIGH (ref 10.6–13.6)
RBC # BLD: 3.53 M/UL — LOW (ref 4.2–5.8)
RBC # FLD: 12.9 % — SIGNIFICANT CHANGE UP (ref 10.3–14.5)
RBC BLD AUTO: ABNORMAL
SMUDGE CELLS # BLD: PRESENT — SIGNIFICANT CHANGE UP
SODIUM SERPL-SCNC: 137 MMOL/L — SIGNIFICANT CHANGE UP (ref 135–145)
SPHEROCYTES BLD QL SMEAR: SLIGHT — SIGNIFICANT CHANGE UP
VARIANT LYMPHS # BLD: 0.9 % — SIGNIFICANT CHANGE UP (ref 0–6)
WBC # BLD: 3.06 K/UL — LOW (ref 3.8–10.5)
WBC # FLD AUTO: 3.06 K/UL — LOW (ref 3.8–10.5)

## 2021-11-25 PROCEDURE — 82784 ASSAY IGA/IGD/IGG/IGM EACH: CPT

## 2021-11-25 PROCEDURE — 86709 HEPATITIS A IGM ANTIBODY: CPT

## 2021-11-25 PROCEDURE — 86381 MITOCHONDRIAL ANTIBODY EACH: CPT

## 2021-11-25 PROCEDURE — 93005 ELECTROCARDIOGRAM TRACING: CPT

## 2021-11-25 PROCEDURE — C1889: CPT

## 2021-11-25 PROCEDURE — 83605 ASSAY OF LACTIC ACID: CPT

## 2021-11-25 PROCEDURE — 80053 COMPREHEN METABOLIC PANEL: CPT

## 2021-11-25 PROCEDURE — 82390 ASSAY OF CERULOPLASMIN: CPT

## 2021-11-25 PROCEDURE — 86376 MICROSOMAL ANTIBODY EACH: CPT

## 2021-11-25 PROCEDURE — 82803 BLOOD GASES ANY COMBINATION: CPT

## 2021-11-25 PROCEDURE — C1769: CPT

## 2021-11-25 PROCEDURE — 86704 HEP B CORE ANTIBODY TOTAL: CPT

## 2021-11-25 PROCEDURE — 86769 SARS-COV-2 COVID-19 ANTIBODY: CPT

## 2021-11-25 PROCEDURE — 83036 HEMOGLOBIN GLYCOSYLATED A1C: CPT

## 2021-11-25 PROCEDURE — 76705 ECHO EXAM OF ABDOMEN: CPT

## 2021-11-25 PROCEDURE — 86850 RBC ANTIBODY SCREEN: CPT

## 2021-11-25 PROCEDURE — 87635 SARS-COV-2 COVID-19 AMP PRB: CPT

## 2021-11-25 PROCEDURE — 87040 BLOOD CULTURE FOR BACTERIA: CPT

## 2021-11-25 PROCEDURE — 86255 FLUORESCENT ANTIBODY SCREEN: CPT

## 2021-11-25 PROCEDURE — 36415 COLL VENOUS BLD VENIPUNCTURE: CPT

## 2021-11-25 PROCEDURE — 82247 BILIRUBIN TOTAL: CPT

## 2021-11-25 PROCEDURE — 83550 IRON BINDING TEST: CPT

## 2021-11-25 PROCEDURE — 86901 BLOOD TYPING SEROLOGIC RH(D): CPT

## 2021-11-25 PROCEDURE — 86038 ANTINUCLEAR ANTIBODIES: CPT

## 2021-11-25 PROCEDURE — 83690 ASSAY OF LIPASE: CPT

## 2021-11-25 PROCEDURE — 84295 ASSAY OF SERUM SODIUM: CPT

## 2021-11-25 PROCEDURE — 82248 BILIRUBIN DIRECT: CPT

## 2021-11-25 PROCEDURE — 86803 HEPATITIS C AB TEST: CPT

## 2021-11-25 PROCEDURE — 84100 ASSAY OF PHOSPHORUS: CPT

## 2021-11-25 PROCEDURE — 99232 SBSQ HOSP IP/OBS MODERATE 35: CPT

## 2021-11-25 PROCEDURE — 83540 ASSAY OF IRON: CPT

## 2021-11-25 PROCEDURE — 82728 ASSAY OF FERRITIN: CPT

## 2021-11-25 PROCEDURE — 86900 BLOOD TYPING SEROLOGIC ABO: CPT

## 2021-11-25 PROCEDURE — 87340 HEPATITIS B SURFACE AG IA: CPT

## 2021-11-25 PROCEDURE — 85610 PROTHROMBIN TIME: CPT

## 2021-11-25 PROCEDURE — 86705 HEP B CORE ANTIBODY IGM: CPT

## 2021-11-25 PROCEDURE — 82962 GLUCOSE BLOOD TEST: CPT

## 2021-11-25 PROCEDURE — 82330 ASSAY OF CALCIUM: CPT

## 2021-11-25 PROCEDURE — 85730 THROMBOPLASTIN TIME PARTIAL: CPT

## 2021-11-25 PROCEDURE — 85025 COMPLETE CBC W/AUTO DIFF WBC: CPT

## 2021-11-25 PROCEDURE — 99285 EMERGENCY DEPT VISIT HI MDM: CPT

## 2021-11-25 PROCEDURE — 84132 ASSAY OF SERUM POTASSIUM: CPT

## 2021-11-25 PROCEDURE — 99239 HOSP IP/OBS DSCHRG MGMT >30: CPT | Mod: GC

## 2021-11-25 PROCEDURE — 86706 HEP B SURFACE ANTIBODY: CPT

## 2021-11-25 PROCEDURE — 83735 ASSAY OF MAGNESIUM: CPT

## 2021-11-25 PROCEDURE — C1726: CPT

## 2021-11-25 RX ORDER — METRONIDAZOLE 500 MG
1 TABLET ORAL
Qty: 21 | Refills: 0
Start: 2021-11-25 | End: 2021-12-01

## 2021-11-25 RX ORDER — CEFPODOXIME PROXETIL 100 MG
1 TABLET ORAL
Qty: 0 | Refills: 0 | DISCHARGE

## 2021-11-25 RX ORDER — CEFPODOXIME PROXETIL 100 MG
1 TABLET ORAL
Qty: 14 | Refills: 0
Start: 2021-11-25 | End: 2021-12-01

## 2021-11-25 RX ADMIN — Medication 100 MILLIGRAM(S): at 07:22

## 2021-11-25 RX ADMIN — URSODIOL 500 MILLIGRAM(S): 250 TABLET, FILM COATED ORAL at 07:22

## 2021-11-25 RX ADMIN — Medication 2: at 12:26

## 2021-11-25 NOTE — DISCHARGE NOTE PROVIDER - NSDCCAREPROVSEEN_GEN_ALL_CORE_FT
Camden Maki, Yash Arroyo, Reynaldo Carter, Mack Camden Maki, Yash Arroyo, Reynaldo Carter, Manjeet Hector

## 2021-11-25 NOTE — PROGRESS NOTE ADULT - ASSESSMENT
56M, Mandarin speaking, PMHx HTN, DM2, cirrhosis, cholelithiasis s/p open CCY (18 years prior) with subsequent repeat episodes of cholangitis requiring ERCP with stone removals (9/2018, 10/2018, 2/24/2020), buccal ca s/p surgery and RT in [place] (2013) presenting with 2 weeks RUQ pain radiating to the back.      #Dilated CBD  Patient presenting to [facility] ED with complaints of 2 weeks of RUQ pain radiating to the back with associated intermittent subjective fevers/chills at home. Found to be febrile to 101.6F in the ED, no leukocytosis or lactate however abdominal US revealing 1.4 cm filling defect in the mid distal CBD which may represent stone, less likely tumor. With prior ERCPs and EGDs, most recent of which was in 2020 (findings as noted below). As per [place] 2018 guidelines, meeting suspected grade 1 mild acute cholangitis.  -ERCP (Feb 2020): Choledocolithiasis. Removed via balloon sweep. Spyglass performed demonstrating abnormal tissue in left hepatic system. Biopsied – negative for malignant cells  -EGD (2019) - Plastic stent in the duodenum. Removed. 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    -c/w broad spectrum abxs, currently on Ceftriaxone & Flagyl, recommend completion of 7 day course of abxs (i.e. Cefpodoxime & Flagyl) upon discharge  -f/u Blood cultures (11/23), currently NGTD  -Continue to closely monitor LTs and total bilirubin with daily CMPs, currently downtrending  -ERCP 11/24 - Calculus of bile duct without cholangitis or cholecystitis with obstruction. Complete removal of many soft CBD stones.   -Patient following with surgery outpatient ([facility]), recommend pursuing surgical evaluation to prevent further recurrences going forward    Case discussed with advanced attending and primary team.     Thank you for allowing us to participate in the care of this patient.  GI will sign off. Please call back with any questions or concerns.     Kelly Leyva DO  Gastroenterology Fellow  Pager: [phone]

## 2021-11-25 NOTE — PROGRESS NOTE ADULT - ATTENDING COMMENTS
56 year old M w/ PMH of HTN, DMII, cirrhosis, buccal cancer s/p resection and RT, s/p cholecystectomy, multiple episodes cholangitis/choledocolithiasis p/w cholangitis, found to have CBD stone, on ceftriaxone flagyl, planned for ERCP today.
seen/discussed with fellow  Agree with above

## 2021-11-25 NOTE — PROGRESS NOTE ADULT - SUBJECTIVE AND OBJECTIVE BOX
GASTROENTEROLOGY PROGRESS NOTE  Patient seen and examined at bedside. Total bilirubin downtrending on AM labs. Remains afebrile overnight.     ROS: Patient notes feeling well, No abdominal pain, nausae/vomiting. Having BMs.     PERTINENT REVIEW OF SYSTEMS:  CONSTITUTIONAL: No weakness, fevers or chills  HEENT: No visual changes; No vertigo or throat pain   GASTROINTESTINAL: As above.  NEUROLOGICAL: No numbness or weakness  SKIN: No itching, burning, rashes, or lesions     Allergies    No Known Allergies    Intolerances      MEDICATIONS:  MEDICATIONS  (STANDING):  cefTRIAXone   IVPB 2000 milliGRAM(s) IV Intermittent every 24 hours  dextrose 40% Gel 15 Gram(s) Oral once  dextrose 5%. 1000 milliLiter(s) (50 mL/Hr) IV Continuous <Continuous>  dextrose 5%. 1000 milliLiter(s) (100 mL/Hr) IV Continuous <Continuous>  dextrose 50% Injectable 25 Gram(s) IV Push once  dextrose 50% Injectable 12.5 Gram(s) IV Push once  dextrose 50% Injectable 25 Gram(s) IV Push once  enoxaparin Injectable 40 milliGRAM(s) SubCutaneous every 24 hours  glucagon  Injectable 1 milliGRAM(s) IntraMuscular once  insulin lispro (ADMELOG) corrective regimen sliding scale   SubCutaneous every 6 hours  lactated ringers. 1000 milliLiter(s) (100 mL/Hr) IV Continuous <Continuous>  metroNIDAZOLE  IVPB 500 milliGRAM(s) IV Intermittent every 8 hours  polyethylene glycol 3350 17 Gram(s) Oral daily  propranolol 10 milliGRAM(s) Oral two times a day  senna 2 Tablet(s) Oral at bedtime  ursodiol Tablet 500 milliGRAM(s) Oral <User Schedule>    MEDICATIONS  (PRN):  acetaminophen     Tablet .. 650 milliGRAM(s) Oral every 6 hours PRN Temp greater or equal to 38C (100.4F), Mild Pain (1 - 3)    Vital Signs Last 24 Hrs  T(C): 36.3 (25 Nov 2021 05:13), Max: 36.6 (24 Nov 2021 20:17)  T(F): 97.4 (25 Nov 2021 05:13), Max: 97.9 (24 Nov 2021 20:17)  HR: 75 (25 Nov 2021 05:13) (60 - 75)  BP: 98/64 (25 Nov 2021 05:13) (93/62 - 105/73)  BP(mean): --  RR: 18 (25 Nov 2021 05:13) (18 - 19)  SpO2: 96% (25 Nov 2021 05:13) (96% - 97%)    11-24 @ 07:01  -  11-25 @ 07:00  --------------------------------------------------------  IN: 0 mL / OUT: 200 mL / NET: -200 mL      PHYSICAL EXAM:      General: Well developed; well nourished; in no acute distress  HEENT: MMM, conjunctiva and sclera clear  Gastrointestinal: Soft, non-tender non-distended; Normal bowel sounds; No rebound or guarding  Extremities: Normal range of motion, No clubbing, cyanosis or edema  Neurological: Alert and oriented x3  Skin: Warm and dry. No obvious rash    LABS:                        11.2   3.06  )-----------( 99       ( 25 Nov 2021 07:51 )             32.3     11-25    137  |  103  |  6<L>  ----------------------------<  113<H>  3.8   |  23  |  0.65    Ca    8.2<L>      25 Nov 2021 07:51  Phos  2.5     11-25  Mg     1.8     11-25    TPro  6.2  /  Alb  2.9<L>  /  TBili  1.4<H>  /  DBili  x   /  AST  35  /  ALT  33  /  AlkPhos  112  11-25    PT/INR - ( 25 Nov 2021 07:51 )   PT: 15.4 sec;   INR: 1.30          PTT - ( 24 Nov 2021 07:02 )  PTT:31.8 sec                  Culture - Blood (collected 23 Nov 2021 19:34)  Source: .Blood Blood-Peripheral  Preliminary Report (24 Nov 2021 20:01):    No growth at 1 day.    Culture - Blood (collected 23 Nov 2021 19:34)  Source: .Blood Blood-Peripheral  Preliminary Report (24 Nov 2021 20:01):    No growth at 1 day.      RADIOLOGY & ADDITIONAL STUDIES:  Reviewed

## 2021-11-25 NOTE — DISCHARGE NOTE PROVIDER - NSDCMRMEDTOKEN_GEN_ALL_CORE_FT
cefpodoxime 200 mg oral tablet: 1 tab(s) orally every 12 hours  metFORMIN 500 mg oral tablet: 1 tab(s) orally 2 times a day  nadolol 20 mg oral tablet: 1 tab(s) orally once a day  ursodiol 500 mg oral tablet: 1 tab(s) orally once a day   cefpodoxime 200 mg oral tablet: 1 tab(s) orally 2 times a day   Flagyl 500 mg oral tablet: 1 tab(s) orally every 8 hours   metFORMIN 500 mg oral tablet: 1 tab(s) orally 2 times a day  nadolol 20 mg oral tablet: 1 tab(s) orally once a day  ursodiol 500 mg oral tablet: 1 tab(s) orally once a day

## 2021-11-25 NOTE — DISCHARGE NOTE NURSING/CASE MANAGEMENT/SOCIAL WORK - PATIENT PORTAL LINK FT
You can access the FollowMyHealth Patient Portal offered by [facility] by registering at the following website: [email or web address]. By joining [email or web address]’s FollowMyHealth portal, you will also be able to view your health information using other applications (apps) compatible with our system.

## 2021-11-25 NOTE — DISCHARGE NOTE PROVIDER - HOSPITAL COURSE
#Discharge: do not delete    Patient is __ yo M/F with past medical history of _____ presented with _____, found to have _____ (one liner)    Hospital course (by problem):     Patient was discharged to: (home/[facility]/acute rehab/hospice, etc, and with what services – home health PT/RN? Home O2?)    New medications:   Changes to old medications:  Medications that were stopped:    Items to follow up as outpatient:    Physical exam at the time of discharge:       #Discharge: do not delete    Patient is __ yo M/F with past medical history of _____ presented with _____, found to have _____ (one liner)    Hospital course (by problem):     Patient was discharged to: (home/[facility]/acute rehab/hospice, etc, and with what services – home health PT/RN? 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     Hospital course (by problem):     #Cholangitis.   ·  Plan: Pt with hx or repeat episode of cholangitis in the past requiring ERCP and stone removals. Pt with fever, RUQ pain, and mild scleral icterus on exam, no AMS or hypotension. Pt had open CCY 18 years prior. Follows with Dr. Gina Fernandez/Dr. Treviño from GI outpatient who recommended he come in for evaluation. S/p 1L NS in ED, CTX, and Flagyll.  - GI consulted follow up recs  - C/w CTX 1g and Flagyl 500mg   - Keep NPO per GI  - ERCP tomorrow with GI  - c/w home ursodiol.    #Cirrhosis.   ·  Plan: Pt with history of cirrhosis for 12 years. Likely 2/2 to alcohol; however patient is unsure of dx of PBC as he also does take ursodiol. No asterixes on exam. MELD-Na 15 on admission <2% 90 day mortality. Unclear if patient has varices or is on nadolol 20mg for BP control.  - Labs for AM: hepatitis panel, anti-LKM, anti Sm, TAMIKO, AMA, iron studies, immunoglobulins panel, and ceruloplasmin   - change nadalol to propranolol 10 BID inpatient.   - Daily coags for MELD scoring    #Thrombocytopenia  - Likely in setting of cirrhosis is improved from previously   - No need for platelet transfusion prior to ERCP.    #Anemia.   ·  Plan: Pt without signs or symptoms of bleeding. Previously was 15.7 now 12.6 on admission. Likely anemia of chronic disease.  - f/u AM iron studies.    #HTN (hypertension).   ·  Plan: Pt with history of HTN on nadolol 20mg daily.   - change to propranolol 10mg BID while inpatient.    #Diabetes mellitus.   On Metformin 500 BID.  - stop metformin start Geraldine   - FSGS q6 while npo  - AM A1c.    #Oral-mouth cancer.   ·  Plan: Pt with buccal ca s/p resection and RT in the past. Not on any chemotherapy medications. Pt mildly dysphasic on exam 2/2 resection. Likely difficult intubation.  - may need S&S when able to eat after procedure.    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 On 11/24, ERCP showed calculus of bile duct without cholangitis or cholecystitis with obstruction and complete removal of many soft CBD stones. Pain and symptoms resolved. Labs parameters altered trending down. Pt transitioned to cefpodoxime 200mg q12h PO and Flagyl 500mg q8h PO for another 7 days.     Hospital course (by problem):     #Cholangitis.   due to multiple stones obstructing the biliary duct. Pt with hx or repeat episode of cholangitis in the past requiring ERCP and stone removals. Pt with fever, RUQ pain, and mild scleral icterus on exam, no AMS or hypotension. Pt had open CCY 18 years prior. Follows with Dr. Gina Fernandez/Dr. Treviño from GI outpatient who recommended he come in for evaluation. S/p 1L NS in ED, CTX, and Flagyll. On 11/24, ERCP showed calculus of bile duct without cholangitis or cholecystitis with obstruction and complete removal of many soft CBD stones. Restarted diet.   PLAN:  - C/w cefpodoxime 200mg q12h PO and Flagyl 500mg q8h PO for 7 days.   - c/w home med ursodiol 20mg.    #Cirrhosis.   Pt with history of cirrhosis for 12 years. Likely 2/2 to alcohol; however patient is unsure of dx of PBC as he also does take ursodiol. No asterixes on exam. MELD-Na 15 on admission <2% 90 day mortality. Unclear if patient has varices or is on nadolol 20mg for BP control. Changed nadalol to propranolol 10 BID inpatient. Ceruloplasmin WNL. MELD-Na 11 on discharge. Coags PT 15.4 and INR 1.3, BT 1.4 Alb 2.9 Ca 8.2. USMAN kappa and lambda elevated. HpBs and HpBc reactives, rest negative.  PLAN:   - F/u Labs for AM: anti-LKM, anti Sm, TAMIKO, AMA, immunoglobulins panel.  - Schedule a followup appointment with gastroenterologist.    #Thrombocytopenia  Likely in setting of cirrhosis is improved from previously. No need for platelet transfusion prior to ERCP.  PLAN:  - Schedule a followup appointment with your PCP.    #Anemia.   Pt without signs or symptoms of bleeding. Previously was 15.7 now 12.6 on admission, on discharge 11.6. Likely anemia of chronic disease. ITBC 188, % sta iron 20 Iron 37 and UIBC 151 Ferritin 860  PLAN:  - Schedule a followup appointment with your PCP.    #HTN (hypertension).   Pt with history of HTN on nadolol 20mg daily. Changed to propranolol 10mg BID while inpatient.  PLAN:  - C/w home med nadolol 20mg QD.    #Diabetes mellitus.   On Metformin 500 BID. Stopped metformin start Geraldine. A1c 6.2.  PLAN:  - C/w home med Metformin 500 BID.    #Oral-mouth cancer.   Pt with buccal ca s/p resection and RT in the past. Not on any chemotherapy medications. Pt mildly dysphasic on exam 2/2 resection. Likely difficult intubation. Able to eat after procedure.  PLAN:  - Schedule a followup appointment with your PCP.    Patient was discharged to: home    New medications: Cefpodoxime 200 mg q12h for 7 days and metronidazol 500mg q8h for 7 days.  Changes to old medications: None.  Medications that were stopped: None.    Items to follow up as outpatient: PCP, Gastroenterologist.    Physical exam at the time of discharge:   - General: NAD, warm to touch  - HEENT: NC/AT; + mildly icteric sclera; moist mucosal membranes.  - Neck: supple, trachea midline  - Cardiovascular: RRR, +S1/S2; NO M/R/G  - Respiratory: CTA B/L; no W/R/R  - Gastrointestinal: soft, no pain in RUQ. + BS, no rebound, no guarding. Abdomen with multiple old healed incisions sites.  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 On 11/24, ERCP showed calculus of bile duct without cholangitis or cholecystitis with obstruction and complete removal of many soft CBD stones. Pain and symptoms resolved. Labs parameters altered trending down. Pt transitioned to cefpodoxime 200mg q12h PO and Flagyl 500mg q8h PO for another 7 days.     Hospital course (by problem):     #Cholangitis.   due to multiple stones obstructing the biliary duct. Pt with hx or repeat episode of cholangitis in the past requiring ERCP and stone removals. Pt with fever, RUQ pain, and mild scleral icterus on exam, no AMS or hypotension. Pt had open CCY 18 years prior. Follows with Dr. Gina Fernandez/Dr. Treviño from GI outpatient who recommended he come in for evaluation. S/p 1L NS in ED, CTX, and Flagyll. On 11/24, ERCP showed calculus of bile duct without cholangitis or cholecystitis with obstruction and complete removal of many soft CBD stones. Restarted diet.   PLAN:  - C/w cefpodoxime 200mg q12h PO and Flagyl 500mg q8h PO for 7 days.   - c/w home med ursodiol 20mg.    #Cirrhosis.   Pt with history of cirrhosis for 12 years. Likely 2/2 to alcohol; however patient is unsure of dx of PBC as he also does take ursodiol. No asterixes on exam. MELD-Na 15 on admission <2% 90 day mortality. Unclear if patient has varices or is on nadolol 20mg for BP control. Changed nadalol to propranolol 10 BID inpatient. Ceruloplasmin WNL. MELD-Na 11 on discharge. Coags PT 15.4 and INR 1.3, BT 1.4 Alb 2.9 Ca 8.2. USMAN kappa and lambda elevated. HpBs and HpBc reactives, rest negative.  PLAN:   - F/u Labs for AM: anti-LKM, anti Sm, TAMIKO, AMA, immunoglobulins panel.  - Schedule a followup appointment with gastroenterologist.    #Thrombocytopenia  Likely in setting of cirrhosis is improved from previously. No need for platelet transfusion prior to ERCP.  PLAN:  - Schedule a followup appointment with your PCP.    #Anemia.   Pt without signs or symptoms of bleeding. Previously was 15.7 now 12.6 on admission, on discharge 11.6. Likely anemia of chronic disease. ITBC 188, % sta iron 20 Iron 37 and UIBC 151 Ferritin 860  PLAN:  - Schedule a followup appointment with your PCP.    #HTN (hypertension).   Pt with history of HTN on nadolol 20mg daily. Changed to propranolol 10mg BID while inpatient.  PLAN:  - C/w home med nadolol 20mg QD.    #Diabetes mellitus.   On Metformin 500 BID. Stopped metformin start Geraldine. A1c 6.2.  PLAN:  - C/w home med Metformin 500 BID.    #Oral-mouth cancer.   Pt with buccal ca s/p resection and RT in the past. Not on any chemotherapy medications. Pt mildly dysphasic on exam 2/2 resection. Likely difficult intubation. Able to eat after procedure.  PLAN:  - Schedule a followup appointment with your PCP.    Patient was discharged to: home    New medications: Cefpodoxime 200 mg q12h for 7 days and metronidazol 500mg q8h for 7 days.  Changes to old medications: None.  Medications that were stopped: None.    Items to follow up as outpatient: PCP, Gastroenterologist.    Physical exam at the time of discharge:   - General: NAD, warm to touch  - HEENT: NC/AT; + mildly icteric sclera; moist mucosal membranes.  - Neck: supple, trachea midline  - Cardiovascular: RRR, +S1/S2; NO M/R/G  - Respiratory: CTA B/L; no W/R/R  - Gastrointestinal: soft, no pain in RUQ. + BS, no rebound, no guarding. Abdomen with multiple old healed incisions sites.  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 On 11/24, ERCP showed calculus of bile duct without cholangitis or cholecystitis with obstruction and complete removal of many soft CBD stones. Pain and symptoms resolved. Labs parameters altered trending down. Pt transitioned to cefpodoxime 200mg q12h PO and Flagyl 500mg q8h PO for another 7 days.     Hospital course (by problem):     #Cholangitis.   due to multiple stones obstructing the biliary duct. Pt with hx or repeat episode of cholangitis in the past requiring ERCP and stone removals. Pt with fever, RUQ pain, and mild scleral icterus on exam, no AMS or hypotension. Pt had open CCY 18 years prior. Follows with Dr. Gina Fernandez/Dr. Treviño from GI outpatient who recommended he come in for evaluation. S/p 1L NS in ED, CTX, and Flagyll. On 11/24, ERCP showed calculus of bile duct without cholangitis or cholecystitis with obstruction and complete removal of many soft CBD stones. Restarted diet.   PLAN:  - C/w cefpodoxime 200mg q12h PO and Flagyl 500mg q8h PO for 7 days.   - c/w home med ursodiol 20mg.    #Cirrhosis.   Pt with history of cirrhosis for 12 years. Likely 2/2 to alcohol; however patient is unsure of dx of PBC as he also does take ursodiol. No asterixes on exam. MELD-Na 15 on admission <2% 90 day mortality. Unclear if patient has varices or is on nadolol 20mg for BP control. Changed nadalol to propranolol 10 BID inpatient. Ceruloplasmin WNL. MELD-Na 11 on discharge. Coags PT 15.4 and INR 1.3, BT 1.4 Alb 2.9 Ca 8.2. USMAN kappa and lambda elevated. HpBs and HpBc reactives, rest negative.  PLAN:   - F/u Labs for AM: anti-LKM, anti Sm, TAMIKO, AMA, immunoglobulins panel.  - Schedule a followup appointment with gastroenterologist.    #Thrombocytopenia  Likely in setting of cirrhosis is improved from previously. No need for platelet transfusion prior to ERCP.  PLAN:  - Schedule a followup appointment with your PCP.    #Anemia.   Pt without signs or symptoms of bleeding. Previously was 15.7 now 12.6 on admission, on discharge 11.6. Likely anemia of chronic disease. ITBC 188, % sta iron 20 Iron 37 and UIBC 151 Ferritin 860  PLAN:  - Schedule a followup appointment with your PCP.    #HTN (hypertension).   Pt with history of HTN on nadolol 20mg daily. Changed to propranolol 10mg BID while inpatient.  PLAN:  - C/w home med nadolol 20mg QD.    #Diabetes mellitus.   On Metformin 500 BID.  A1c 6.2.  PLAN:  - C/w home med Metformin 500 BID.    #Oral-mouth cancer.   Pt with buccal ca s/p resection and RT in the past. Not on any chemotherapy medications. Pt mildly dysphasic on exam 2/2 resection. Likely difficult intubation. Able to eat after procedure.  PLAN:  - Schedule a followup appointment with your PCP.    Patient was discharged to: home    New medications: Cefpodoxime 200 mg q12h for 7 days and metronidazol 500mg q8h for 7 days.  Changes to old medications: None.  Medications that were stopped: None.    Items to follow up as outpatient: PCP, Gastroenterologist.    Physical exam at the time of discharge:   - General: NAD, warm to touch  - HEENT: NC/AT; + mildly icteric sclera; moist mucosal membranes.  - Neck: supple, trachea midline  - Cardiovascular: RRR, +S1/S2; NO M/R/G  - Respiratory: CTA B/L; no W/R/R  - Gastrointestinal: soft, no pain in RUQ. + BS, no rebound, no guarding. Abdomen with multiple old healed incisions sites.  - Extremities: WWP; no edema or cyanosis  - Vascular: 2+ radial, DP/PT pulses B/L  - Neurological: AAOx3; no focal deficits      Time spent: 65 minutes

## 2021-11-25 NOTE — DISCHARGE NOTE PROVIDER - NSDCCPCAREPLAN_GEN_ALL_CORE_FT
PRINCIPAL DISCHARGE DIAGNOSIS  Diagnosis: Multiple obstructing stones in biliary tract  Assessment and Plan of Treatment:   - C/w cefpodoxime 200mg q12h PO and Flagyl 500mg q8h PO for 7 days.   - c/w home med ursodiol 20mg.      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      SECONDARY DISCHARGE DIAGNOSES  Diagnosis: Cholangitis  Assessment and Plan of Treatment: Primary biliary cholangitis, previously called primary biliary cirrhosis, is a chronic disease in which the bile ducts in your liver are slowly destroyed.  Bile is a fluid made in your liver. It aids with digestion and helps you absorb certain vitamins. It also helps your body get rid of cholesterol, toxins and worn-out red blood cells. Chronic inflammation in the liver can lead to bile duct damage, irreversible scarring of liver tissue (cirrhosis) and eventually, liver failure.  Although it affects both sexes, primary biliary cholangitis mostly affects women. It's considered an autoimmune disease, which means your body's immune system is mistakenly attacking healthy cells and tissue. Researchers think a combination of genetic and environmental factors triggers the disease. It usually develops slowly. At this time, there's no cure for primary biliary cholangitis, but medication can slow liver damage, especially if treatment begins early.  Start CEFPODOXIME 200mg q12h PO and FLAGYL 500mg q8h PO for 7 days.  Schedule a followup appointment with gastroenterologist.      Diagnosis: Cirrhosis  Assessment and Plan of Treatment: Cirrhosis is a late stage of scarring (fibrosis) of the liver caused by many forms of liver diseases and conditions, such as hepatitis and chronic alcoholism.  Each time your liver is injured — whether by disease, excessive alcohol consumption or another cause — it tries to repair itself. In the process, scar tissue forms. As cirrhosis progresses, more and more scar tissue forms, making it difficult for the liver to function (decompensated cirrhosis). Advanced cirrhosis is life-threatening.  The liver damage done by cirrhosis generally can't be undone. But if liver cirrhosis is diagnosed early and the cause is treated, further damage can be limited and, rarely, reversed.  Continue with NADOLOL and URSODIOL in your previous dose.  Follow up labs such as anti-LKM, anti Sm, TAMIKO, AMA and immunoglobulins panel with your gastroenterologist  Schedule a followup appointment with gastroenterologist.    Diagnosis: Anemia  Assessment and Plan of Treatment: Anemia is a condition in which you lack enough healthy red blood cells to carry adequate oxygen to your body's tissues. Having anemia, also referred to as low hemoglobin, can make you feel tired and weak.  There are many forms of anemia, each with its own cause. Anemia can be temporary or long term and can range from mild to severe. In most cases, anemia has more than one cause. See your doctor if you suspect that you have anemia. It can be a warning sign of serious illness.  Treatments for anemia, which depend on the cause, range from taking supplements to having medical procedures. You might be able to prevent some types of anemia by eating a healthy, varied diet.  Likely anemia of chronic disease type.  Schedule a followup appointment with your PCP.    Diagnosis: HTN (hypertension)  Assessment and Plan of Treatment: Continue with METFORMIN in your previous dose.  Schedule a followup appointment with your PCP.    Diagnosis: Diabetes mellitus  Assessment and Plan of Treatment: Continue with METFORMIN in your previous dose.  Schedule a followup appointment with your primary care physician.    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  Schedule a followup appointment with your primary care physician.     PRINCIPAL DISCHARGE DIAGNOSIS  Diagnosis: Multiple obstructing stones in biliary tract  Assessment and Plan of Treatment: Resolved after ERCP procedure.      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  Schedule a followup appointment with your primary care physician Dr Gupta,  services were used and reinforced importance of follow up within 1 week of discharge    Diagnosis: HTN (hypertension)  Assessment and Plan of Treatment: Continue with METFORMIN in your previous dose.  Schedule a followup appointment with your PCP.    Diagnosis: Diabetes mellitus  Assessment and Plan of Treatment: Continue with METFORMIN in your previous dose.  Schedule a followup appointment with your primary care physician.    Diagnosis: Cirrhosis  Assessment and Plan of Treatment: Cirrhosis is a late stage of scarring (fibrosis) of the liver caused by many forms of liver diseases and conditions, such as hepatitis and chronic alcoholism.  Each time your liver is injured — whether by disease, excessive alcohol consumption or another cause — it tries to repair itself. In the process, scar tissue forms. As cirrhosis progresses, more and more scar tissue forms, making it difficult for the liver to function (decompensated cirrhosis). Advanced cirrhosis is life-threatening.  The liver damage done by cirrhosis generally can't be undone. But if liver cirrhosis is diagnosed early and the cause is treated, further damage can be limited and, rarely, reversed.  Continue with NADOLOL and URSODIOL in your previous dose.  Follow up labs such as anti-LKM, anti Sm, TAMIKO, AMA and immunoglobulins panel with your gastroenterologist  Schedule a followup appointment with gastroenterologist.    Diagnosis: Anemia  Assessment and Plan of Treatment: Anemia is a condition in which you lack enough healthy red blood cells to carry adequate oxygen to your body's tissues. Having anemia, also referred to as low hemoglobin, can make you feel tired and weak.  There are many forms of anemia, each with its own cause. Anemia can be temporary or long term and can range from mild to severe. In most cases, anemia has more than one cause. See your doctor if you suspect that you have anemia. It can be a warning sign of serious illness.  Treatments for anemia, which depend on the cause, range from taking supplements to having medical procedures. You might be able to prevent some types of anemia by eating a healthy, varied diet.  Likely anemia of chronic disease type.  Schedule a followup appointment with your PCP.    Diagnosis: Cholangitis  Assessment and Plan of Treatment: Primary biliary cholangitis, previously called primary biliary cirrhosis, is a chronic disease in which the bile ducts in your liver are slowly destroyed.  Bile is a fluid made in your liver. It aids with digestion and helps you absorb certain vitamins. It also helps your body get rid of cholesterol, toxins and worn-out red blood cells. Chronic inflammation in the liver can lead to bile duct damage, irreversible scarring of liver tissue (cirrhosis) and eventually, liver failure.  Although it affects both sexes, primary biliary cholangitis mostly affects women. It's considered an autoimmune disease, which means your body's immune system is mistakenly attacking healthy cells and tissue. Researchers think a combination of genetic and environmental factors triggers the disease. It usually develops slowly. At this time, there's no cure for primary biliary cholangitis, but medication can slow liver damage, especially if treatment begins early.  Start CEFPODOXIME 200mg q12h PO and FLAGYL 500mg q8h PO for 7 days.  Schedule a followup appointment with gastroenterologist within 1 week of discharge,  services used for reinforcement

## 2021-11-25 NOTE — DISCHARGE NOTE PROVIDER - CARE PROVIDER_API CALL
SAMANTHA GÓMEZ  Internal Medicine  [address] [place] [address]  Phone: [phone]  Fax: [phone]  Follow Up Time: 2 weeks    Brannon Treviño  Gastroenterology  [address] [place] [address]  Phone: [phone]  Fax: [phone]  Follow Up Time: 2 weeks

## 2021-11-25 NOTE — DISCHARGE NOTE PROVIDER - PROVIDER TOKENS
PROVIDER:[TOKEN:[[id number]],FOLLOWUP:[2 weeks]],PROVIDER:[TOKEN:[[id number]],FOLLOWUP:[2 weeks]]

## 2021-11-26 LAB
ANA PAT FLD IF-IMP: ABNORMAL
ANA TITR SER: ABNORMAL
LKM AB SER-ACNC: <20.1 UNITS — SIGNIFICANT CHANGE UP (ref 0–20)

## 2021-11-28 LAB
CULTURE RESULTS: SIGNIFICANT CHANGE UP
CULTURE RESULTS: SIGNIFICANT CHANGE UP
GLUCOSE BLDC GLUCOMTR-MCNC: 156 MG/DL — HIGH (ref 70–99)
MITOCHONDRIA AB SER-ACNC: SIGNIFICANT CHANGE UP
SMOOTH MUSCLE AB SER-ACNC: ABNORMAL
SPECIMEN SOURCE: SIGNIFICANT CHANGE UP
SPECIMEN SOURCE: SIGNIFICANT CHANGE UP

## 2021-12-01 DIAGNOSIS — Z87.891 PERSONAL HISTORY OF NICOTINE DEPENDENCE: ICD-10-CM

## 2021-12-01 DIAGNOSIS — K70.30 ALCOHOLIC CIRRHOSIS OF LIVER WITHOUT ASCITES: ICD-10-CM

## 2021-12-01 DIAGNOSIS — Z85.819 PERSONAL HISTORY OF MALIGNANT NEOPLASM OF UNSPECIFIED SITE OF LIP, ORAL CAVITY, AND PHARYNX: ICD-10-CM

## 2021-12-01 DIAGNOSIS — I10 ESSENTIAL (PRIMARY) HYPERTENSION: ICD-10-CM

## 2021-12-01 DIAGNOSIS — E11.9 TYPE 2 DIABETES MELLITUS WITHOUT COMPLICATIONS: ICD-10-CM

## 2021-12-01 DIAGNOSIS — R10.9 UNSPECIFIED ABDOMINAL PAIN: ICD-10-CM

## 2021-12-01 DIAGNOSIS — I45.10 UNSPECIFIED RIGHT BUNDLE-BRANCH BLOCK: ICD-10-CM

## 2021-12-01 DIAGNOSIS — K80.51 CALCULUS OF BILE DUCT WITHOUT CHOLANGITIS OR CHOLECYSTITIS WITH OBSTRUCTION: ICD-10-CM

## 2021-12-01 DIAGNOSIS — D69.6 THROMBOCYTOPENIA, UNSPECIFIED: ICD-10-CM

## 2021-12-01 DIAGNOSIS — D63.8 ANEMIA IN OTHER CHRONIC DISEASES CLASSIFIED ELSEWHERE: ICD-10-CM

## 2021-12-01 DIAGNOSIS — R13.10 DYSPHAGIA, UNSPECIFIED: ICD-10-CM

## 2021-12-01 DIAGNOSIS — Z96.642 PRESENCE OF LEFT ARTIFICIAL HIP JOINT: ICD-10-CM
